# Patient Record
Sex: MALE | Race: WHITE | Employment: OTHER | URBAN - METROPOLITAN AREA
[De-identification: names, ages, dates, MRNs, and addresses within clinical notes are randomized per-mention and may not be internally consistent; named-entity substitution may affect disease eponyms.]

---

## 2018-11-16 ENCOUNTER — ANESTHESIA EVENT (OUTPATIENT)
Dept: OPERATING ROOM | Age: 58
DRG: 620 | End: 2018-11-16
Payer: COMMERCIAL

## 2018-11-19 ENCOUNTER — ANESTHESIA (OUTPATIENT)
Dept: OPERATING ROOM | Age: 58
DRG: 620 | End: 2018-11-19
Payer: COMMERCIAL

## 2018-11-19 ENCOUNTER — HOSPITAL ENCOUNTER (INPATIENT)
Age: 58
LOS: 1 days | Discharge: HOME | DRG: 620 | End: 2018-11-20
Attending: SURGERY | Admitting: SURGERY
Payer: COMMERCIAL

## 2018-11-19 VITALS — OXYGEN SATURATION: 94 % | SYSTOLIC BLOOD PRESSURE: 156 MMHG | TEMPERATURE: 98.1 F | DIASTOLIC BLOOD PRESSURE: 74 MMHG

## 2018-11-19 LAB
ABO/RH: NORMAL
ANION GAP SERPL CALCULATED.3IONS-SCNC: 12 MMOL/L (ref 3–16)
ANTIBODY SCREEN: NORMAL
BUN BLDV-MCNC: 15 MG/DL (ref 7–20)
CALCIUM SERPL-MCNC: 10.2 MG/DL (ref 8.3–10.6)
CHLORIDE BLD-SCNC: 103 MMOL/L (ref 99–110)
CO2: 26 MMOL/L (ref 21–32)
CREAT SERPL-MCNC: 0.6 MG/DL (ref 0.9–1.3)
GFR AFRICAN AMERICAN: >60
GFR NON-AFRICAN AMERICAN: >60
GLUCOSE BLD-MCNC: 245 MG/DL (ref 70–99)
GLUCOSE BLD-MCNC: 247 MG/DL (ref 70–99)
GLUCOSE BLD-MCNC: 60 MG/DL (ref 70–99)
GLUCOSE BLD-MCNC: 60 MG/DL (ref 70–99)
GLUCOSE BLD-MCNC: 77 MG/DL (ref 70–99)
GLUCOSE BLD-MCNC: 91 MG/DL (ref 70–99)
HCT VFR BLD CALC: 40.1 % (ref 40.5–52.5)
HEMOGLOBIN: 13.5 G/DL (ref 13.5–17.5)
MCH RBC QN AUTO: 29.2 PG (ref 26–34)
MCHC RBC AUTO-ENTMCNC: 33.6 G/DL (ref 31–36)
MCV RBC AUTO: 86.8 FL (ref 80–100)
PDW BLD-RTO: 14.2 % (ref 12.4–15.4)
PERFORMED ON: ABNORMAL
PERFORMED ON: NORMAL
PERFORMED ON: NORMAL
PLATELET # BLD: 272 K/UL (ref 135–450)
PMV BLD AUTO: 8.6 FL (ref 5–10.5)
POTASSIUM SERPL-SCNC: 3.7 MMOL/L (ref 3.5–5.1)
RBC # BLD: 4.62 M/UL (ref 4.2–5.9)
SODIUM BLD-SCNC: 141 MMOL/L (ref 136–145)
WBC # BLD: 10.8 K/UL (ref 4–11)

## 2018-11-19 PROCEDURE — 0D164ZA BYPASS STOMACH TO JEJUNUM, PERCUTANEOUS ENDOSCOPIC APPROACH: ICD-10-PCS | Performed by: SURGERY

## 2018-11-19 PROCEDURE — 36415 COLL VENOUS BLD VENIPUNCTURE: CPT

## 2018-11-19 PROCEDURE — 6370000000 HC RX 637 (ALT 250 FOR IP): Performed by: ANESTHESIOLOGY

## 2018-11-19 PROCEDURE — 2709999900 HC NON-CHARGEABLE SUPPLY: Performed by: SURGERY

## 2018-11-19 PROCEDURE — 2720000010 HC SURG SUPPLY STERILE: Performed by: SURGERY

## 2018-11-19 PROCEDURE — 86900 BLOOD TYPING SEROLOGIC ABO: CPT

## 2018-11-19 PROCEDURE — 3600000004 HC SURGERY LEVEL 4 BASE: Performed by: SURGERY

## 2018-11-19 PROCEDURE — 6360000002 HC RX W HCPCS: Performed by: SURGERY

## 2018-11-19 PROCEDURE — 2500000003 HC RX 250 WO HCPCS: Performed by: SURGERY

## 2018-11-19 PROCEDURE — 3600000014 HC SURGERY LEVEL 4 ADDTL 15MIN: Performed by: SURGERY

## 2018-11-19 PROCEDURE — 2580000003 HC RX 258: Performed by: SURGERY

## 2018-11-19 PROCEDURE — 3700000000 HC ANESTHESIA ATTENDED CARE: Performed by: SURGERY

## 2018-11-19 PROCEDURE — 83036 HEMOGLOBIN GLYCOSYLATED A1C: CPT

## 2018-11-19 PROCEDURE — 2500000003 HC RX 250 WO HCPCS: Performed by: NURSE ANESTHETIST, CERTIFIED REGISTERED

## 2018-11-19 PROCEDURE — C1773 RET DEV, INSERTABLE: HCPCS | Performed by: SURGERY

## 2018-11-19 PROCEDURE — 80048 BASIC METABOLIC PNL TOTAL CA: CPT

## 2018-11-19 PROCEDURE — 6360000002 HC RX W HCPCS: Performed by: NURSE ANESTHETIST, CERTIFIED REGISTERED

## 2018-11-19 PROCEDURE — 7100000000 HC PACU RECOVERY - FIRST 15 MIN: Performed by: SURGERY

## 2018-11-19 PROCEDURE — C1713 ANCHOR/SCREW BN/BN,TIS/BN: HCPCS | Performed by: SURGERY

## 2018-11-19 PROCEDURE — 6360000002 HC RX W HCPCS: Performed by: ANESTHESIOLOGY

## 2018-11-19 PROCEDURE — C1894 INTRO/SHEATH, NON-LASER: HCPCS | Performed by: SURGERY

## 2018-11-19 PROCEDURE — S0028 INJECTION, FAMOTIDINE, 20 MG: HCPCS | Performed by: SURGERY

## 2018-11-19 PROCEDURE — 7100000001 HC PACU RECOVERY - ADDTL 15 MIN: Performed by: SURGERY

## 2018-11-19 PROCEDURE — 1200000000 HC SEMI PRIVATE

## 2018-11-19 PROCEDURE — 86901 BLOOD TYPING SEROLOGIC RH(D): CPT

## 2018-11-19 PROCEDURE — 86850 RBC ANTIBODY SCREEN: CPT

## 2018-11-19 PROCEDURE — 85027 COMPLETE CBC AUTOMATED: CPT

## 2018-11-19 PROCEDURE — 3700000001 HC ADD 15 MINUTES (ANESTHESIA): Performed by: SURGERY

## 2018-11-19 DEVICE — STAPLE REINF LN SEAMGUARD ECH60: Type: IMPLANTABLE DEVICE | Site: ABDOMEN | Status: FUNCTIONAL

## 2018-11-19 RX ORDER — FENTANYL CITRATE 50 UG/ML
50 INJECTION, SOLUTION INTRAMUSCULAR; INTRAVENOUS EVERY 5 MIN PRN
Status: DISCONTINUED | OUTPATIENT
Start: 2018-11-19 | End: 2018-11-19 | Stop reason: HOSPADM

## 2018-11-19 RX ORDER — METOCLOPRAMIDE HYDROCHLORIDE 5 MG/ML
10 INJECTION INTRAMUSCULAR; INTRAVENOUS ONCE
Status: DISCONTINUED | OUTPATIENT
Start: 2018-11-19 | End: 2018-11-19 | Stop reason: SDUPTHER

## 2018-11-19 RX ORDER — PROMETHAZINE HYDROCHLORIDE 25 MG/ML
6.25 INJECTION, SOLUTION INTRAMUSCULAR; INTRAVENOUS
Status: DISCONTINUED | OUTPATIENT
Start: 2018-11-19 | End: 2018-11-19 | Stop reason: HOSPADM

## 2018-11-19 RX ORDER — HYDROMORPHONE HCL 110MG/55ML
PATIENT CONTROLLED ANALGESIA SYRINGE INTRAVENOUS PRN
Status: DISCONTINUED | OUTPATIENT
Start: 2018-11-19 | End: 2018-11-19 | Stop reason: SDUPTHER

## 2018-11-19 RX ORDER — APREPITANT 40 MG/1
40 CAPSULE ORAL ONCE
Status: COMPLETED | OUTPATIENT
Start: 2018-11-19 | End: 2018-11-19

## 2018-11-19 RX ORDER — SERTRALINE HYDROCHLORIDE 100 MG/1
100 TABLET, FILM COATED ORAL DAILY
Status: DISCONTINUED | OUTPATIENT
Start: 2018-11-20 | End: 2018-11-20 | Stop reason: HOSPADM

## 2018-11-19 RX ORDER — ACETAMINOPHEN 10 MG/ML
1000 INJECTION, SOLUTION INTRAVENOUS ONCE
Status: DISCONTINUED | OUTPATIENT
Start: 2018-11-19 | End: 2018-11-19 | Stop reason: SDUPTHER

## 2018-11-19 RX ORDER — MIDAZOLAM HYDROCHLORIDE 1 MG/ML
INJECTION INTRAMUSCULAR; INTRAVENOUS PRN
Status: DISCONTINUED | OUTPATIENT
Start: 2018-11-19 | End: 2018-11-19 | Stop reason: SDUPTHER

## 2018-11-19 RX ORDER — CEFAZOLIN SODIUM 2 G/50ML
2 SOLUTION INTRAVENOUS ONCE
Status: DISCONTINUED | OUTPATIENT
Start: 2018-11-19 | End: 2018-11-19 | Stop reason: SDUPTHER

## 2018-11-19 RX ORDER — HYDROCODONE BITARTRATE AND ACETAMINOPHEN 5; 325 MG/1; MG/1
1 TABLET ORAL EVERY 4 HOURS PRN
Status: DISCONTINUED | OUTPATIENT
Start: 2018-11-19 | End: 2018-11-20

## 2018-11-19 RX ORDER — ACETAMINOPHEN 10 MG/ML
1000 INJECTION, SOLUTION INTRAVENOUS ONCE
Status: COMPLETED | OUTPATIENT
Start: 2018-11-19 | End: 2018-11-19

## 2018-11-19 RX ORDER — HEPARIN SODIUM 5000 [USP'U]/ML
5000 INJECTION, SOLUTION INTRAVENOUS; SUBCUTANEOUS
Status: DISCONTINUED | OUTPATIENT
Start: 2018-11-19 | End: 2018-11-19 | Stop reason: SDUPTHER

## 2018-11-19 RX ORDER — PANTOPRAZOLE SODIUM 40 MG/10ML
40 INJECTION, POWDER, LYOPHILIZED, FOR SOLUTION INTRAVENOUS DAILY
Status: DISCONTINUED | OUTPATIENT
Start: 2018-11-20 | End: 2018-11-20 | Stop reason: HOSPADM

## 2018-11-19 RX ORDER — SODIUM CHLORIDE 0.9 % (FLUSH) 0.9 %
10 SYRINGE (ML) INJECTION EVERY 12 HOURS SCHEDULED
Status: DISCONTINUED | OUTPATIENT
Start: 2018-11-19 | End: 2018-11-20 | Stop reason: HOSPADM

## 2018-11-19 RX ORDER — DEXAMETHASONE SODIUM PHOSPHATE 4 MG/ML
10 INJECTION, SOLUTION INTRA-ARTICULAR; INTRALESIONAL; INTRAMUSCULAR; INTRAVENOUS; SOFT TISSUE ONCE
Status: DISCONTINUED | OUTPATIENT
Start: 2018-11-19 | End: 2018-11-19 | Stop reason: SDUPTHER

## 2018-11-19 RX ORDER — DEXTROSE MONOHYDRATE 50 MG/ML
100 INJECTION, SOLUTION INTRAVENOUS PRN
Status: DISCONTINUED | OUTPATIENT
Start: 2018-11-19 | End: 2018-11-20 | Stop reason: HOSPADM

## 2018-11-19 RX ORDER — CEFAZOLIN SODIUM 2 G/50ML
2 SOLUTION INTRAVENOUS ONCE
Status: COMPLETED | OUTPATIENT
Start: 2018-11-19 | End: 2018-11-19

## 2018-11-19 RX ORDER — SODIUM CHLORIDE, SODIUM LACTATE, POTASSIUM CHLORIDE, CALCIUM CHLORIDE 600; 310; 30; 20 MG/100ML; MG/100ML; MG/100ML; MG/100ML
INJECTION, SOLUTION INTRAVENOUS CONTINUOUS
Status: DISCONTINUED | OUTPATIENT
Start: 2018-11-19 | End: 2018-11-19 | Stop reason: SDUPTHER

## 2018-11-19 RX ORDER — METOCLOPRAMIDE HYDROCHLORIDE 5 MG/ML
10 INJECTION INTRAMUSCULAR; INTRAVENOUS ONCE
Status: COMPLETED | OUTPATIENT
Start: 2018-11-19 | End: 2018-11-19

## 2018-11-19 RX ORDER — APREPITANT 40 MG/1
40 CAPSULE ORAL ONCE
Status: DISCONTINUED | OUTPATIENT
Start: 2018-11-19 | End: 2018-11-19 | Stop reason: SDUPTHER

## 2018-11-19 RX ORDER — ONDANSETRON 2 MG/ML
4 INJECTION INTRAMUSCULAR; INTRAVENOUS ONCE
Status: DISCONTINUED | OUTPATIENT
Start: 2018-11-19 | End: 2018-11-19 | Stop reason: SDUPTHER

## 2018-11-19 RX ORDER — DEXAMETHASONE SODIUM PHOSPHATE 4 MG/ML
10 INJECTION, SOLUTION INTRA-ARTICULAR; INTRALESIONAL; INTRAMUSCULAR; INTRAVENOUS; SOFT TISSUE ONCE
Status: COMPLETED | OUTPATIENT
Start: 2018-11-19 | End: 2018-11-19

## 2018-11-19 RX ORDER — 0.9 % SODIUM CHLORIDE 0.9 %
10 VIAL (ML) INJECTION DAILY
Status: DISCONTINUED | OUTPATIENT
Start: 2018-11-20 | End: 2018-11-20 | Stop reason: HOSPADM

## 2018-11-19 RX ORDER — SODIUM CHLORIDE 0.9 % (FLUSH) 0.9 %
10 SYRINGE (ML) INJECTION PRN
Status: DISCONTINUED | OUTPATIENT
Start: 2018-11-19 | End: 2018-11-20 | Stop reason: HOSPADM

## 2018-11-19 RX ORDER — SODIUM CHLORIDE, SODIUM LACTATE, POTASSIUM CHLORIDE, CALCIUM CHLORIDE 600; 310; 30; 20 MG/100ML; MG/100ML; MG/100ML; MG/100ML
INJECTION, SOLUTION INTRAVENOUS CONTINUOUS
Status: DISCONTINUED | OUTPATIENT
Start: 2018-11-19 | End: 2018-11-20 | Stop reason: HOSPADM

## 2018-11-19 RX ORDER — ONDANSETRON 2 MG/ML
4 INJECTION INTRAMUSCULAR; INTRAVENOUS
Status: DISCONTINUED | OUTPATIENT
Start: 2018-11-19 | End: 2018-11-19 | Stop reason: HOSPADM

## 2018-11-19 RX ORDER — EPHEDRINE SULFATE 50 MG/ML
INJECTION INTRAVENOUS PRN
Status: DISCONTINUED | OUTPATIENT
Start: 2018-11-19 | End: 2018-11-19 | Stop reason: SDUPTHER

## 2018-11-19 RX ORDER — HEPARIN SODIUM 5000 [USP'U]/ML
5000 INJECTION, SOLUTION INTRAVENOUS; SUBCUTANEOUS
Status: COMPLETED | OUTPATIENT
Start: 2018-11-19 | End: 2018-11-19

## 2018-11-19 RX ORDER — FENTANYL CITRATE 50 UG/ML
25 INJECTION, SOLUTION INTRAMUSCULAR; INTRAVENOUS EVERY 5 MIN PRN
Status: DISCONTINUED | OUTPATIENT
Start: 2018-11-19 | End: 2018-11-19 | Stop reason: HOSPADM

## 2018-11-19 RX ORDER — ONDANSETRON 2 MG/ML
4 INJECTION INTRAMUSCULAR; INTRAVENOUS EVERY 6 HOURS PRN
Status: DISCONTINUED | OUTPATIENT
Start: 2018-11-19 | End: 2018-11-20 | Stop reason: HOSPADM

## 2018-11-19 RX ORDER — SODIUM CHLORIDE, SODIUM LACTATE, POTASSIUM CHLORIDE, CALCIUM CHLORIDE 600; 310; 30; 20 MG/100ML; MG/100ML; MG/100ML; MG/100ML
INJECTION, SOLUTION INTRAVENOUS CONTINUOUS
Status: DISCONTINUED | OUTPATIENT
Start: 2018-11-19 | End: 2018-11-19

## 2018-11-19 RX ORDER — DEXTROSE MONOHYDRATE 25 G/50ML
12.5 INJECTION, SOLUTION INTRAVENOUS PRN
Status: DISCONTINUED | OUTPATIENT
Start: 2018-11-19 | End: 2018-11-20 | Stop reason: HOSPADM

## 2018-11-19 RX ORDER — FENTANYL CITRATE 50 UG/ML
INJECTION, SOLUTION INTRAMUSCULAR; INTRAVENOUS PRN
Status: DISCONTINUED | OUTPATIENT
Start: 2018-11-19 | End: 2018-11-19 | Stop reason: SDUPTHER

## 2018-11-19 RX ORDER — BUPIVACAINE HYDROCHLORIDE AND EPINEPHRINE 5; 5 MG/ML; UG/ML
INJECTION, SOLUTION EPIDURAL; INTRACAUDAL; PERINEURAL PRN
Status: DISCONTINUED | OUTPATIENT
Start: 2018-11-19 | End: 2018-11-19 | Stop reason: HOSPADM

## 2018-11-19 RX ORDER — PROPOFOL 10 MG/ML
INJECTION, EMULSION INTRAVENOUS PRN
Status: DISCONTINUED | OUTPATIENT
Start: 2018-11-19 | End: 2018-11-19 | Stop reason: SDUPTHER

## 2018-11-19 RX ORDER — MAGNESIUM HYDROXIDE 1200 MG/15ML
LIQUID ORAL CONTINUOUS PRN
Status: COMPLETED | OUTPATIENT
Start: 2018-11-19 | End: 2018-11-19

## 2018-11-19 RX ORDER — KETOROLAC TROMETHAMINE 30 MG/ML
30 INJECTION, SOLUTION INTRAMUSCULAR; INTRAVENOUS EVERY 6 HOURS
Status: DISCONTINUED | OUTPATIENT
Start: 2018-11-19 | End: 2018-11-20 | Stop reason: HOSPADM

## 2018-11-19 RX ORDER — LISINOPRIL 2.5 MG/1
2.5 TABLET ORAL DAILY
Status: DISCONTINUED | OUTPATIENT
Start: 2018-11-20 | End: 2018-11-20 | Stop reason: HOSPADM

## 2018-11-19 RX ORDER — ONDANSETRON 2 MG/ML
4 INJECTION INTRAMUSCULAR; INTRAVENOUS ONCE
Status: COMPLETED | OUTPATIENT
Start: 2018-11-19 | End: 2018-11-19

## 2018-11-19 RX ORDER — ROCURONIUM BROMIDE 10 MG/ML
INJECTION, SOLUTION INTRAVENOUS PRN
Status: DISCONTINUED | OUTPATIENT
Start: 2018-11-19 | End: 2018-11-19 | Stop reason: SDUPTHER

## 2018-11-19 RX ORDER — SODIUM CHLORIDE, SODIUM LACTATE, POTASSIUM CHLORIDE, AND CALCIUM CHLORIDE .6; .31; .03; .02 G/100ML; G/100ML; G/100ML; G/100ML
IRRIGANT IRRIGATION PRN
Status: DISCONTINUED | OUTPATIENT
Start: 2018-11-19 | End: 2018-11-19 | Stop reason: HOSPADM

## 2018-11-19 RX ORDER — NICOTINE POLACRILEX 4 MG
15 LOZENGE BUCCAL PRN
Status: DISCONTINUED | OUTPATIENT
Start: 2018-11-19 | End: 2018-11-20 | Stop reason: HOSPADM

## 2018-11-19 RX ORDER — HYDROCODONE BITARTRATE AND ACETAMINOPHEN 5; 325 MG/1; MG/1
2 TABLET ORAL EVERY 4 HOURS PRN
Status: DISCONTINUED | OUTPATIENT
Start: 2018-11-19 | End: 2018-11-20

## 2018-11-19 RX ORDER — SUCCINYLCHOLINE CHLORIDE 20 MG/ML
INJECTION INTRAMUSCULAR; INTRAVENOUS PRN
Status: DISCONTINUED | OUTPATIENT
Start: 2018-11-19 | End: 2018-11-19 | Stop reason: SDUPTHER

## 2018-11-19 RX ADMIN — PROPOFOL 200 MG: 10 INJECTION, EMULSION INTRAVENOUS at 19:34

## 2018-11-19 RX ADMIN — KETOROLAC TROMETHAMINE 30 MG: 30 INJECTION, SOLUTION INTRAMUSCULAR at 22:23

## 2018-11-19 RX ADMIN — ROCURONIUM BROMIDE 50 MG: 10 INJECTION, SOLUTION INTRAVENOUS at 19:42

## 2018-11-19 RX ADMIN — HYDROMORPHONE HYDROCHLORIDE 1 MG: 2 INJECTION, SOLUTION INTRAMUSCULAR; INTRAVENOUS; SUBCUTANEOUS at 21:45

## 2018-11-19 RX ADMIN — CEFAZOLIN SODIUM 2 G: 2 SOLUTION INTRAVENOUS at 19:42

## 2018-11-19 RX ADMIN — ROCURONIUM BROMIDE 20 MG: 10 INJECTION, SOLUTION INTRAVENOUS at 20:15

## 2018-11-19 RX ADMIN — SODIUM CHLORIDE, SODIUM LACTATE, POTASSIUM CHLORIDE, AND CALCIUM CHLORIDE: 600; 310; 30; 20 INJECTION, SOLUTION INTRAVENOUS at 14:01

## 2018-11-19 RX ADMIN — FENTANYL CITRATE 100 MCG: 50 INJECTION INTRAMUSCULAR; INTRAVENOUS at 19:24

## 2018-11-19 RX ADMIN — FENTANYL CITRATE 100 MCG: 50 INJECTION INTRAMUSCULAR; INTRAVENOUS at 19:34

## 2018-11-19 RX ADMIN — APREPITANT 40 MG: 40 CAPSULE ORAL at 16:10

## 2018-11-19 RX ADMIN — ACETAMINOPHEN 1000 MG: 10 INJECTION, SOLUTION INTRAVENOUS at 19:24

## 2018-11-19 RX ADMIN — SUGAMMADEX 200 MG: 100 INJECTION, SOLUTION INTRAVENOUS at 21:30

## 2018-11-19 RX ADMIN — EPHEDRINE SULFATE 10 MG: 50 INJECTION INTRAVENOUS at 20:05

## 2018-11-19 RX ADMIN — EPHEDRINE SULFATE 10 MG: 50 INJECTION INTRAVENOUS at 20:00

## 2018-11-19 RX ADMIN — HYDROMORPHONE HYDROCHLORIDE 1 MG: 2 INJECTION, SOLUTION INTRAMUSCULAR; INTRAVENOUS; SUBCUTANEOUS at 21:00

## 2018-11-19 RX ADMIN — FENTANYL CITRATE 25 MCG: 50 INJECTION, SOLUTION INTRAMUSCULAR; INTRAVENOUS at 22:40

## 2018-11-19 RX ADMIN — SUCCINYLCHOLINE CHLORIDE 140 MG: 20 INJECTION, SOLUTION INTRAMUSCULAR; INTRAVENOUS; PARENTERAL at 19:34

## 2018-11-19 RX ADMIN — MIDAZOLAM HYDROCHLORIDE 2 MG: 1 INJECTION INTRAMUSCULAR; INTRAVENOUS at 21:49

## 2018-11-19 RX ADMIN — HEPARIN SODIUM 5000 UNITS: 5000 INJECTION INTRAVENOUS; SUBCUTANEOUS at 17:29

## 2018-11-19 RX ADMIN — METRONIDAZOLE 1000 MG: 500 INJECTION, SOLUTION INTRAVENOUS at 19:42

## 2018-11-19 RX ADMIN — DEXAMETHASONE SODIUM PHOSPHATE 10 MG: 4 INJECTION, SOLUTION INTRAMUSCULAR; INTRAVENOUS at 16:22

## 2018-11-19 RX ADMIN — INSULIN HUMAN 10 UNITS: 100 INJECTION, SOLUTION PARENTERAL at 22:19

## 2018-11-19 RX ADMIN — ONDANSETRON 4 MG: 2 INJECTION INTRAMUSCULAR; INTRAVENOUS at 17:34

## 2018-11-19 RX ADMIN — FAMOTIDINE 20 MG: 10 INJECTION, SOLUTION INTRAVENOUS at 16:13

## 2018-11-19 RX ADMIN — FENTANYL CITRATE 50 MCG: 50 INJECTION, SOLUTION INTRAMUSCULAR; INTRAVENOUS at 22:21

## 2018-11-19 RX ADMIN — FENTANYL CITRATE 25 MCG: 50 INJECTION, SOLUTION INTRAMUSCULAR; INTRAVENOUS at 22:34

## 2018-11-19 RX ADMIN — MIDAZOLAM HYDROCHLORIDE 2 MG: 1 INJECTION INTRAMUSCULAR; INTRAVENOUS at 21:53

## 2018-11-19 RX ADMIN — SODIUM CHLORIDE, SODIUM LACTATE, POTASSIUM CHLORIDE, AND CALCIUM CHLORIDE: 600; 310; 30; 20 INJECTION, SOLUTION INTRAVENOUS at 19:45

## 2018-11-19 RX ADMIN — EPHEDRINE SULFATE 10 MG: 50 INJECTION INTRAVENOUS at 20:10

## 2018-11-19 RX ADMIN — EPHEDRINE SULFATE 20 MG: 50 INJECTION INTRAVENOUS at 20:36

## 2018-11-19 RX ADMIN — SODIUM CHLORIDE, SODIUM LACTATE, POTASSIUM CHLORIDE, AND CALCIUM CHLORIDE: 600; 310; 30; 20 INJECTION, SOLUTION INTRAVENOUS at 21:57

## 2018-11-19 RX ADMIN — METOCLOPRAMIDE 10 MG: 5 INJECTION, SOLUTION INTRAMUSCULAR; INTRAVENOUS at 18:17

## 2018-11-19 ASSESSMENT — PULMONARY FUNCTION TESTS
PIF_VALUE: 24
PIF_VALUE: 21
PIF_VALUE: 30
PIF_VALUE: 9
PIF_VALUE: 30
PIF_VALUE: 31
PIF_VALUE: 25
PIF_VALUE: 30
PIF_VALUE: 24
PIF_VALUE: 30
PIF_VALUE: 24
PIF_VALUE: 30
PIF_VALUE: 0
PIF_VALUE: 30
PIF_VALUE: 20
PIF_VALUE: 30
PIF_VALUE: 1
PIF_VALUE: 30
PIF_VALUE: 30
PIF_VALUE: 25
PIF_VALUE: 30
PIF_VALUE: 30
PIF_VALUE: 17
PIF_VALUE: 30
PIF_VALUE: 31
PIF_VALUE: 30
PIF_VALUE: 24
PIF_VALUE: 30
PIF_VALUE: 33
PIF_VALUE: 24
PIF_VALUE: 30
PIF_VALUE: 31
PIF_VALUE: 30
PIF_VALUE: 2
PIF_VALUE: 30
PIF_VALUE: 3
PIF_VALUE: 30
PIF_VALUE: 30
PIF_VALUE: 25
PIF_VALUE: 30
PIF_VALUE: 30
PIF_VALUE: 18
PIF_VALUE: 1
PIF_VALUE: 30
PIF_VALUE: 25
PIF_VALUE: 30
PIF_VALUE: 6
PIF_VALUE: 30
PIF_VALUE: 1
PIF_VALUE: 30
PIF_VALUE: 1
PIF_VALUE: 1
PIF_VALUE: 25
PIF_VALUE: 20
PIF_VALUE: 31
PIF_VALUE: 30
PIF_VALUE: 30
PIF_VALUE: 1
PIF_VALUE: 30
PIF_VALUE: 1
PIF_VALUE: 5
PIF_VALUE: 30
PIF_VALUE: 25
PIF_VALUE: 30
PIF_VALUE: 17
PIF_VALUE: 30
PIF_VALUE: 1
PIF_VALUE: 30
PIF_VALUE: 24
PIF_VALUE: 1
PIF_VALUE: 24
PIF_VALUE: 30
PIF_VALUE: 24
PIF_VALUE: 30
PIF_VALUE: 18
PIF_VALUE: 24
PIF_VALUE: 23
PIF_VALUE: 25
PIF_VALUE: 31
PIF_VALUE: 1
PIF_VALUE: 30
PIF_VALUE: 30
PIF_VALUE: 1
PIF_VALUE: 30
PIF_VALUE: 24
PIF_VALUE: 24
PIF_VALUE: 30
PIF_VALUE: 30
PIF_VALUE: 32
PIF_VALUE: 30
PIF_VALUE: 1
PIF_VALUE: 30
PIF_VALUE: 21
PIF_VALUE: 30
PIF_VALUE: 3
PIF_VALUE: 30
PIF_VALUE: 10
PIF_VALUE: 30
PIF_VALUE: 30
PIF_VALUE: 18
PIF_VALUE: 30
PIF_VALUE: 14
PIF_VALUE: 30
PIF_VALUE: 1
PIF_VALUE: 30
PIF_VALUE: 24
PIF_VALUE: 30
PIF_VALUE: 17
PIF_VALUE: 30

## 2018-11-19 ASSESSMENT — PAIN SCALES - GENERAL
PAINLEVEL_OUTOF10: 6
PAINLEVEL_OUTOF10: 7
PAINLEVEL_OUTOF10: 6
PAINLEVEL_OUTOF10: 5

## 2018-11-19 ASSESSMENT — PAIN - FUNCTIONAL ASSESSMENT: PAIN_FUNCTIONAL_ASSESSMENT: 0-10

## 2018-11-19 ASSESSMENT — LIFESTYLE VARIABLES: SMOKING_STATUS: 0

## 2018-11-19 NOTE — H&P
(BMI) 40.0-44.9, adult   3)  I25.10 Atherosclerotic heart disease of native coronary artery without angina pectoris   4)  E11.9 Type 2 diabetes mellitus without complications   5)  I83 Essential (primary) hypertension   6)  E78.0 Pure hypercholesterolemia, exp as of 10-01-16   7)  I25.2 Old myocardial infarction   8)  I20.9 Angina pectoris, unspecified   9)  G47.9 Sleep disorder, unspecified  10)  F41.9 Anxiety disorder, unspecified  11)  Z98.84 Bariatric surgery status  12)  K90.9 Intestinal malabsorption, unspecified  13)  K21.9 Gastro-esophageal reflux disease without esophagitis  Mr. Bharathi Stringer comes in today for his procedure. He remains a good candidate and desires to proceed. Counseling:    I saw Aditya Avelar in pre-op hold and we discussed the risks/benefits/alternatives of gastric bypass. We diiscussed post-op leaks, bleeding, and infection risks. We discussed the need for long-term vitamin supplementation and monitoring. Lastly, we discussed potential long-term complications such as marginal ulcers and bowel obstructions. Aditya Avelar and I also discussed in detail that the risks for complications associated with the proposed procedure are greater than usual when converting/revising a bariatric operation. For instance, the risk for leak, bleeding and blood clots are greatly increased compared to a primary operation. Aditya Avelar demonstrates understanding and agrees to proceed. Aditya Avelar, any available family members, and I discussed the possibility for aborting the proposed procedure. We discussed that this decision could be made intraoperatively if anatomical considerations or extensive adhesions would lead to greater than usual risk. He demonstrates understanding and accepts this as a possible outcome.

## 2018-11-19 NOTE — ANESTHESIA PRE PROCEDURE
% 50 mL IVPB (duplex)  2 g Intravenous Once Mitch Rodes, DO        metronidazole (FLAGYL) 1,000 mg IVPB  1,000 mg Intravenous On Call to [facility], DO        heparin (porcine) injection 5,000 Units  5,000 Units Subcutaneous On Call to [address] [place] [address], DO        acetaminophen (OFIRMEV) infusion 1,000 mg  1,000 mg Intravenous Once Mitch Rodes, DO        famotidine (PEPCID) injection 20 mg  20 mg Intravenous Once Mitch Rodes, DO        [facility] 4 mg  4 mg Intravenous Once Mitch Rodes, DO        metoclopramide (REGLAN) injection 10 mg  10 mg Intravenous Once Mitch Rodes, DO        Dexamethasone Sodium Phosphate injection 10 mg  10 mg Intravenous Once Brittni S Sveta, DO        aprepitant (EMEND) capsule 40 mg  40 mg Oral Once Brittni S Mad[place], DO        lactated ringers infusion   Intravenous Continuous Jun Arellano MD           Allergies:  No Known Allergies    Problem List:    Patient Active Problem List   Diagnosis Code    Morbid obesity ([facility] [address] E66.01       Past Medical History:        Diagnosis Date    Arthritis     Asthma     CAD (coronary artery disease)     Depression     Diabetes mellitus ([facility] [address] [facility]     GERD (gastroesophageal reflux disease)     Hyperlipidemia     Obesity     Vitamin D deficiency        Past Surgical History:        Procedure Laterality Date    ANGIOPLASTY  sept 2015    stent    CHOLECYSTECTOMY      HERNIA REPAIR      three    OTHER SURGICAL HISTORY N/A 10/20/2015    LAPAROSCOPIC SLEEVE GASTRECTOMY           OTHER SURGICAL HISTORY      interstim     bladder and bowel Right Back Hip    TESTICLE REMOVAL Right        Social History:    Social History   Substance Use Topics    Smoking status: Never Smoker    Smokeless tobacco: Never Used    Alcohol use No                                Counseling given: Not Answered      Vital Signs (Current):   Vitals:    11/16/18 1410   Weight: 295 lb (133.8 kg)   Height: 6' 1\" (1.854 m) GI/Hepatic/Renal:   (+) hiatal hernia, GERD: well controlled, morbid obesity          Endo/Other: Negative Endo/Other ROS   (+) DiabetesType II DM, well controlled, using insulin, . ROS comment: Vitamin D deficiency Abdominal:           Vascular: negative vascular ROS. Anesthesia Plan      general     ASA 3       Induction: intravenous. MIPS: Postoperative opioids intended. Anesthetic plan and risks discussed with patient. Plan discussed with CRNA.     Attending anesthesiologist reviewed and agrees with Pavel Mehta MD   11/19/2018

## 2018-11-20 VITALS
RESPIRATION RATE: 18 BRPM | WEIGHT: 296 LBS | BODY MASS INDEX: 39.23 KG/M2 | HEIGHT: 73 IN | SYSTOLIC BLOOD PRESSURE: 130 MMHG | HEART RATE: 84 BPM | OXYGEN SATURATION: 95 % | DIASTOLIC BLOOD PRESSURE: 70 MMHG | TEMPERATURE: 98 F

## 2018-11-20 LAB
ANION GAP SERPL CALCULATED.3IONS-SCNC: 11 MMOL/L (ref 3–16)
BUN BLDV-MCNC: 22 MG/DL (ref 7–20)
CALCIUM SERPL-MCNC: 9.5 MG/DL (ref 8.3–10.6)
CHLORIDE BLD-SCNC: 100 MMOL/L (ref 99–110)
CO2: 25 MMOL/L (ref 21–32)
CREAT SERPL-MCNC: 0.8 MG/DL (ref 0.9–1.3)
ESTIMATED AVERAGE GLUCOSE: 139.9 MG/DL
GFR AFRICAN AMERICAN: >60
GFR NON-AFRICAN AMERICAN: >60
GLUCOSE BLD-MCNC: 191 MG/DL (ref 70–99)
GLUCOSE BLD-MCNC: 197 MG/DL (ref 70–99)
GLUCOSE BLD-MCNC: 216 MG/DL (ref 70–99)
HBA1C MFR BLD: 6.5 %
HCT VFR BLD CALC: 36.2 % (ref 40.5–52.5)
HEMOGLOBIN: 11.9 G/DL (ref 13.5–17.5)
PERFORMED ON: ABNORMAL
PERFORMED ON: ABNORMAL
POTASSIUM SERPL-SCNC: 4.6 MMOL/L (ref 3.5–5.1)
SODIUM BLD-SCNC: 136 MMOL/L (ref 136–145)

## 2018-11-20 PROCEDURE — 94799 UNLISTED PULMONARY SVC/PX: CPT

## 2018-11-20 PROCEDURE — 2580000003 HC RX 258: Performed by: SURGERY

## 2018-11-20 PROCEDURE — 94760 N-INVAS EAR/PLS OXIMETRY 1: CPT

## 2018-11-20 PROCEDURE — 85014 HEMATOCRIT: CPT

## 2018-11-20 PROCEDURE — 6370000000 HC RX 637 (ALT 250 FOR IP): Performed by: STUDENT IN AN ORGANIZED HEALTH CARE EDUCATION/TRAINING PROGRAM

## 2018-11-20 PROCEDURE — 80048 BASIC METABOLIC PNL TOTAL CA: CPT

## 2018-11-20 PROCEDURE — 94150 VITAL CAPACITY TEST: CPT

## 2018-11-20 PROCEDURE — 6370000000 HC RX 637 (ALT 250 FOR IP): Performed by: SURGERY

## 2018-11-20 PROCEDURE — C9113 INJ PANTOPRAZOLE SODIUM, VIA: HCPCS | Performed by: SURGERY

## 2018-11-20 PROCEDURE — 36415 COLL VENOUS BLD VENIPUNCTURE: CPT

## 2018-11-20 PROCEDURE — 94664 DEMO&/EVAL PT USE INHALER: CPT

## 2018-11-20 PROCEDURE — 6360000002 HC RX W HCPCS: Performed by: SURGERY

## 2018-11-20 PROCEDURE — 85018 HEMOGLOBIN: CPT

## 2018-11-20 RX ORDER — OXYCODONE HYDROCHLORIDE AND ACETAMINOPHEN 5; 325 MG/1; MG/1
1 TABLET ORAL EVERY 4 HOURS PRN
Status: DISCONTINUED | OUTPATIENT
Start: 2018-11-20 | End: 2018-11-20 | Stop reason: HOSPADM

## 2018-11-20 RX ORDER — SOFT LENS RINSE,STORE SOLUTION
1 SOLUTION, NON-ORAL MISCELLANEOUS PRN
Status: DISCONTINUED | OUTPATIENT
Start: 2018-11-20 | End: 2018-11-20 | Stop reason: HOSPADM

## 2018-11-20 RX ORDER — OXYCODONE HYDROCHLORIDE AND ACETAMINOPHEN 5; 325 MG/1; MG/1
2 TABLET ORAL EVERY 4 HOURS PRN
Status: DISCONTINUED | OUTPATIENT
Start: 2018-11-20 | End: 2018-11-20 | Stop reason: HOSPADM

## 2018-11-20 RX ADMIN — PANTOPRAZOLE SODIUM 40 MG: 40 INJECTION, POWDER, FOR SOLUTION INTRAVENOUS at 08:23

## 2018-11-20 RX ADMIN — HYOSCYAMINE SULFATE 125 MCG: 0.12 TABLET, ORALLY DISINTEGRATING ORAL at 00:00

## 2018-11-20 RX ADMIN — KETOROLAC TROMETHAMINE 30 MG: 30 INJECTION, SOLUTION INTRAMUSCULAR at 11:22

## 2018-11-20 RX ADMIN — INSULIN LISPRO 3 UNITS: 100 INJECTION, SOLUTION INTRAVENOUS; SUBCUTANEOUS at 08:25

## 2018-11-20 RX ADMIN — INSULIN LISPRO 3 UNITS: 100 INJECTION, SOLUTION INTRAVENOUS; SUBCUTANEOUS at 00:00

## 2018-11-20 RX ADMIN — HYOSCYAMINE SULFATE 125 MCG: 0.12 TABLET, ORALLY DISINTEGRATING ORAL at 05:34

## 2018-11-20 RX ADMIN — INSULIN LISPRO 3 UNITS: 100 INJECTION, SOLUTION INTRAVENOUS; SUBCUTANEOUS at 11:22

## 2018-11-20 RX ADMIN — METFORMIN HYDROCHLORIDE 500 MG: 500 TABLET, FILM COATED ORAL at 00:00

## 2018-11-20 RX ADMIN — METFORMIN HYDROCHLORIDE 500 MG: 500 TABLET, FILM COATED ORAL at 08:23

## 2018-11-20 RX ADMIN — SERTRALINE HYDROCHLORIDE 100 MG: 100 TABLET ORAL at 08:23

## 2018-11-20 RX ADMIN — Medication 1 DROP: at 05:35

## 2018-11-20 RX ADMIN — HYOSCYAMINE SULFATE 125 MCG: 0.12 TABLET, ORALLY DISINTEGRATING ORAL at 11:22

## 2018-11-20 RX ADMIN — LISINOPRIL 2.5 MG: 2.5 TABLET ORAL at 08:23

## 2018-11-20 RX ADMIN — ENOXAPARIN SODIUM 40 MG: 40 INJECTION SUBCUTANEOUS at 08:24

## 2018-11-20 RX ADMIN — OXYCODONE AND ACETAMINOPHEN 1 TABLET: 5; 325 TABLET ORAL at 08:23

## 2018-11-20 RX ADMIN — Medication 10 ML: at 08:23

## 2018-11-20 ASSESSMENT — PAIN SCALES - GENERAL
PAINLEVEL_OUTOF10: 3
PAINLEVEL_OUTOF10: 3

## 2018-11-20 NOTE — OP NOTE
using 5-mm optical trocar under direct visualization. CO2 was used for pneumoperitoneum. I then placed a left flank 5-mm trocar, left upper quadrant 15-mm trocar, right upper quadrant 12-mm trocar and right subcostal 5-mm trocars all under direct visualization. I also placed a subxiphoid David liver retractor also under direct visualization. Adhesions were taken down as needed to facilitate exposure. Adhesiolysis was very difficult because of previous mesh. Once the adhesions were lysed we were able to split the omentum. The bowel was evaluated and had good reach towards the hiatus. JEJUNOJEJUNOSTOMY   The greater omentum was then identified and divided from its inferior margin to the transverse mesocolon using the Harmonic scalpel. The proximal jejunum and ligament of Treitz were then identified. We then measured the jejunum 50 cm from the ligament of Treitz and divided the small bowel using an Endo-WILEY stapler with a white cartridge. The mesentery was then divided using an Endo-WILEY stapler with a white cartridge and staple line reinforcement. The Harmonic scalpel was then used to divide the distal staple line to improve mobility of the Andi limb. The Andi limb was then measured approximately 150 cm and then was approximated [id number] with the biliopancreatic limb. The Harmonic was then used to create enterotomies at the tip of the biliopancreatic limb and on the antimesenteric margin of the distal Andi limb. A white load WILEY stapler was then inserted through the enterotomies, was closed, fired intraluminally and was then removed without difficulty. The common enterostomy was then closed using an additional firing of an Endo-WILEY stapler with a white cartridge. The mesentery was then closed in a running fashion using 2-0 silk incorporating an antiobstruction stitch between the distal Andi limb and the biliopancreatic limb. A piece of Seamguard was incorporated into the mesenteric suture line. was then resected using a white load Endo-WILEY staple firing. The specimen was collected in a specimen retrieval bag and was removed. I then placed imbricating 2-0 Vicryl sutures at the lateral margins of the anastomosis to imbricate the crossing staple line. The 34-Filipino orogastric bougie was then passed through the anastomosis without difficulty. Intraoperative underwater air leak test was performed with the installation of 60 mL of air without evidence for air leak. The orogastric tube was then removed. The Andi limb was evaluated along its entire length and demonstrated no evidence of kinking or twisting. We then utilized aerosolized fibrin glue and applied this to the entire staple line of the GJ and candy cane. At the completion of the procedure we evaluated the surgical area for bleeding and hemostasis was excellent. The [facility] liver retractor was then removed under direct visualization. The 15mm trocar site was then closed with a PDS suture and a laparoscopic port closure device. The wound was then irrigated with copious amounts of sterile saline. An 8 Fr SCOT was positioned at the fascial layer of the 15 mm wound and was brought out through the L flank 5mm trocar site. This was secured using 2-0 Silk and was placed to Bulb suction. Will plan to keep this for 1 week then remove in the office. The trocar's were used to release the pneumoperitoneum with valsalva, and were then removed All wounds were closed with simple subcuticular 4-0 Monocryl suture. Steri-Strips and dry sterile dressings were applied. The patient tolerated the procedure well and was extubated in the operating room. All counts were correct. SPECIMEN  None. DISPOSITION  Stable to recovery. COMPKLICATIONS  None. There were no complications to this procedure and at the end all counts were correct.

## 2018-11-20 NOTE — PROGRESS NOTES
PACU Transfer Note    Vitals:    11/19/18 2243   BP: (!) 111/55   Pulse: 94   Resp: 16   Temp: 97.6 °F (36.4 °C)   SpO2: 97%   BP WDL and meets criteria for transfer to inpatient unit.      In: 6256 [I.V.:1505]  Out: 50     Pain assessment:  present - adequately treated  Pain Level: 5    Report given to Receiving unit RN.    11/19/2018 10:45 PM

## 2018-11-20 NOTE — PROGRESS NOTES
Patient discharged to home, IV removed, pressure dressing applied. AVS reviewed with patient and spouse, patient verbalized understanding. Patient escorted to private vehicle via wheelchair without incident.

## 2021-02-10 ENCOUNTER — HOSPITAL ENCOUNTER (OUTPATIENT)
Dept: CT IMAGING | Age: 61
Discharge: HOME | End: 2021-02-10
Payer: MEDICARE

## 2021-02-10 DIAGNOSIS — M17.11 OSTEOARTHRITIS OF RIGHT KNEE, UNSPECIFIED OSTEOARTHRITIS TYPE: ICD-10-CM

## 2021-02-10 PROCEDURE — 73700 CT LOWER EXTREMITY W/O DYE: CPT

## 2021-03-13 ENCOUNTER — OFFICE VISIT (OUTPATIENT)
Dept: PRIMARY CARE CLINIC | Age: 61
End: 2021-03-13
Payer: MEDICARE

## 2021-03-13 DIAGNOSIS — Z01.818 PREOP EXAMINATION: Primary | ICD-10-CM

## 2021-03-13 PROCEDURE — G8421 BMI NOT CALCULATED: HCPCS | Performed by: NURSE PRACTITIONER

## 2021-03-13 PROCEDURE — 99211 OFF/OP EST MAY X REQ PHY/QHP: CPT | Performed by: NURSE PRACTITIONER

## 2021-03-13 PROCEDURE — G8428 CUR MEDS NOT DOCUMENT: HCPCS | Performed by: NURSE PRACTITIONER

## 2021-03-14 LAB — SARS-COV-2: NOT DETECTED

## 2021-04-09 ENCOUNTER — NURSE ONLY (OUTPATIENT)
Dept: PRIMARY CARE CLINIC | Age: 61
End: 2021-04-09
Payer: MEDICARE

## 2021-04-09 DIAGNOSIS — Z01.818 PREOP EXAMINATION: Primary | ICD-10-CM

## 2021-04-09 PROCEDURE — 99211 OFF/OP EST MAY X REQ PHY/QHP: CPT | Performed by: NURSE PRACTITIONER

## 2021-04-09 RX ORDER — ALBUTEROL SULFATE 90 UG/1
2 AEROSOL, METERED RESPIRATORY (INHALATION) EVERY 4 HOURS PRN
COMMUNITY
Start: 2020-11-21

## 2021-04-09 NOTE — PROGRESS NOTES
[phone]. 4. Please call [phone] option #2 option #2 if you have not been preregistered yet. On the day of your procedure bring your insurance card and photo ID. You will be registered at your bedside once brought back to your room. 5. DO NOT EAT ANYTHING eight hours prior to your arrival for surgery. May have 8 ounces of water 4 hours prior to your arrival for surgery. NOTE: ALL Gastric, Bariatric and Bowel surgery patients MUST follow their surgeon's instructions. 6. MEDICATIONS    Take the following medications with a SMALL sip of water: protonix   Use your usual dose of inhalers the morning of surgery. BRING your rescue inhaler with you to hospital.    Anesthesia does NOT want you to take insulin the morning of surgery. They will control your blood sugar while you are at the hospital. Please contact your ordering physician for instructions regarding your insulin the night before your procedure. If you have an insulin pump, please keep it set on basal rate. 7. Do not swallow water when brushing teeth. No gum, candy, mints or ice chips. Refrain from smoking or at least decrease the amount. 8. Dress in loose, comfortable clothing appropriate for redressing after your procedure. Do not wear jewelry (including body piercings), make-up (especially NO eye make-up), fingernail polish (NO toenail polish if foot/leg surgery), lotion, powders or metal hairclips. 9. Dentures, glasses, or contacts will need to be removed before your procedure. Bring cases for your glasses, contacts, dentures, or hearing aids to protect them while you are in surgery. 10. If you use a CPAP, please bring it with you on the day of your procedure. 11. We recommend that valuable personal  belongings such as cash, cell phones, e-tablets or jewelry, be left at home during your stay. The hospital will not be responsible for valuables that are not secured in the hospital safe.  However, if your insurance requires a co-pay, you may want to bring a method of payment, i.e. Check or credit card, if you wish to pay your co-pay the day of surgery. 12. If you are to stay overnight, you may bring a bag with personal items. Please have any large items you may need brought in by your family after your arrival to your hospital room. 15. If you have a Living Will or Durable Power of , please bring a copy on the day of your procedure. 15. With your permission, one family member may accompany you while you are being prepared for surgery. Once you are ready, additional family members may join you. HOW WE KEEP YOU SAFE and WORK TO PREVENT SURGICAL SITE INFECTIONS:  1. Health care workers should always check your ID bracelet to verify your name and birth date. You will be asked many times to state your name, date of birth, and allergies. 2. Health care workers should always clean their hands with soap or alcohol gel before providing care to you. It is okay to ask anyone if they cleaned their hands before they touch you. 3. You will be actively involved in verifying the type of procedure you are having and ensuring the correct surgical site. This will be confirmed multiple times prior to your procedure. Do NOT lionel your surgery site UNLESS instructed to by your surgeon. 4. Do not shave or wax for 72 hours prior to procedure near your operative site. Shaving with a razor can irritate your skin and make it easier to develop an infection. On the day of your procedure, any hair that needs to be removed near the surgical site will be clipped by a healthcare worker using a special clippers designed to avoid skin irritation. 5. When you are in the operating room, your surgical site will be cleansed with a special soap, and in most cases, you will be given an antibiotic before the surgery begins. What to expect AFTER YOUR PROCEDURE:  1.  Immediately following your procedure, your will be taken to the PACU for the first phase of your recovery. Your nurse will help you recover from any potential side effects of anesthesia, such as extreme drowsiness, changes in your vital signs or breathing patterns. Nausea, headache, muscle aches, or sore throat may also occur after anesthesia. Your nurse will help you manage these potential side effects. 2. For comfort and safety, arrange to have someone at home with you for the first 24 hours after discharge. 3. You and your family will be given written instructions about your diet, activity, dressing care, medications, and return visits. 4. Once at home, should issues with nausea, pain, or bleeding occur, or should you notice any signs of infection, you should call your surgeon. 5. Always clean your hands before and after caring for your wound. Do not let your family touch your surgery site without cleaning their hands. 6. Narcotic pain medications can cause significant constipation. You may want to add a stool softener to your postoperative medication schedule or speak to your surgeon on how best to manage this SIDE EFFECT. SPECIAL INSTRUCTIONS      Thank you for allowing us to care for you. We strive to exceed your expectations in the delivery of care and service provided to you and your family. If you need to contact the [facility] [place] [address] staff for any reason, please call us at [phone]    Instructions reviewed with patient during preadmission testing phone interview. Teena Key. 4/9/2021 .11:01 AM      ADDITIONAL EDUCATIONAL INFORMATION REVIEWED PER PHONE WITH YOU AND/OR YOUR FAMILY:  Yes Hibiclens® Bathing Instructions   No Antibacterial Soap

## 2021-04-10 LAB — SARS-COV-2: NOT DETECTED

## 2021-04-11 LAB — CULTURE NOSE: NORMAL

## 2021-04-14 ENCOUNTER — ANESTHESIA EVENT (OUTPATIENT)
Dept: OPERATING ROOM | Age: 61
DRG: 470 | End: 2021-04-14
Payer: MEDICARE

## 2021-04-14 NOTE — PROGRESS NOTES
[facility] / [facility] [place] ([facility]) Kathy Garcia, [address]    Acknowledgment of Informed Consent for Surgical or Medical Procedure and Sedation  I agree to allow doctor(s) FAISAL DEJESUS  and his/her associates or assistants, including residents and/or other qualified medical practitioner to perform the following medical treatment or procedure and to administer or direct the administration of sedation as necessary:  Procedure(s): RIGHT TOTAL KNEE ARTHROPLASTY [address] [place]       My doctor has explained the following regarding the proposed procedure:   the explanation of the procedure   the benefits of the procedure   the potential problems that might occur during recuperation   the risks and side effects of the procedure which could include but are not limited to severe blood loss, infection, stroke or death   the benefits, risks and side effect of alternative procedures including the consequences of declining this procedure or any alternative procedures   the likelihood of achieving satisfactory results. I acknowledge no guarantee or assurance has been made to me regarding the results. I understand that during the course of this treatment/procedure, unforeseen conditions can occur which require an additional or different procedure. I agree to allow my physician or assistants to perform such extension of the original procedure as they may find necessary. I understand that sedation will often result in temporary impairment of memory and fine motor skills and that sedation can occasionally progress to a state of deep sedation or general anesthesia. I understand the risks of anesthesia for surgery include, but are not limited to, sore throat, hoarseness, injury to face, mouth, or teeth; nausea; headache; injury to blood vessels or nerves; death, brain damage, or paralysis.     I understand that if I have a Limitation of Treatment order in effect during my hospitalization, the order may or may not be in effect during this procedure. I give my doctor permission to give me blood or blood products. I understand that there are risks with receiving blood such as hepatitis, AIDS, fever, or allergic reaction. I acknowledge that the risks, benefits, and alternatives of this treatment have been explained to me and that no express or implied warranty has been given by the hospital, any blood bank, or any person or entity as to the blood or blood components transfused. At the discretion of my doctor, I agree to allow observers, equipment/product representatives and allow photographing, and/or televising of the procedure, provided my name or identity is maintained confidentially. I agree the hospital may dispose of or use for scientific or educational purposes any tissue, fluid, or body parts which may be removed.     ________________________________Date________Time______ am/pm  ([place] [facility])  Patient or Signature of Closest Relative or Legal Guardian    ________________________________Date________Time______am/pm      Page 1 of  1  Witness

## 2021-04-15 ENCOUNTER — HOSPITAL ENCOUNTER (INPATIENT)
Age: 61
LOS: 1 days | Discharge: NURSING, REHAB OR LONG-TERM CARE | DRG: 470 | End: 2021-04-20
Attending: ORTHOPAEDIC SURGERY | Admitting: ORTHOPAEDIC SURGERY
Payer: MEDICARE

## 2021-04-15 ENCOUNTER — APPOINTMENT (OUTPATIENT)
Dept: GENERAL RADIOLOGY | Age: 61
DRG: 470 | End: 2021-04-15
Attending: ORTHOPAEDIC SURGERY
Payer: MEDICARE

## 2021-04-15 ENCOUNTER — ANESTHESIA (OUTPATIENT)
Dept: OPERATING ROOM | Age: 61
DRG: 470 | End: 2021-04-15
Payer: MEDICARE

## 2021-04-15 VITALS — SYSTOLIC BLOOD PRESSURE: 144 MMHG | TEMPERATURE: 97.9 F | OXYGEN SATURATION: 85 % | DIASTOLIC BLOOD PRESSURE: 86 MMHG

## 2021-04-15 DIAGNOSIS — M17.11 PRIMARY OSTEOARTHRITIS OF RIGHT KNEE: Primary | ICD-10-CM

## 2021-04-15 LAB
ABO/RH: NORMAL
ALBUMIN SERPL-MCNC: 3.7 G/DL (ref 3.4–5)
ALP BLD-CCNC: 77 U/L (ref 40–129)
ALT SERPL-CCNC: 27 U/L (ref 10–40)
ANTIBODY SCREEN: NORMAL
APTT: 33.5 SEC (ref 24.2–36.2)
AST SERPL-CCNC: 41 U/L (ref 15–37)
BILIRUB SERPL-MCNC: 0.4 MG/DL (ref 0–1)
BILIRUBIN DIRECT: <0.2 MG/DL (ref 0–0.3)
BILIRUBIN, INDIRECT: ABNORMAL MG/DL (ref 0–1)
GLUCOSE BLD-MCNC: 125 MG/DL (ref 70–99)
GLUCOSE BLD-MCNC: 201 MG/DL (ref 70–99)
GLUCOSE BLD-MCNC: 251 MG/DL (ref 70–99)
GLUCOSE BLD-MCNC: 309 MG/DL (ref 70–99)
INR BLD: 1.07 (ref 0.86–1.14)
PERFORMED ON: ABNORMAL
PROTHROMBIN TIME: 12.4 SEC (ref 10–13.2)
TOTAL PROTEIN: 7 G/DL (ref 6.4–8.2)

## 2021-04-15 PROCEDURE — 6370000000 HC RX 637 (ALT 250 FOR IP): Performed by: ORTHOPAEDIC SURGERY

## 2021-04-15 PROCEDURE — 7100000000 HC PACU RECOVERY - FIRST 15 MIN: Performed by: ORTHOPAEDIC SURGERY

## 2021-04-15 PROCEDURE — 2580000003 HC RX 258: Performed by: ORTHOPAEDIC SURGERY

## 2021-04-15 PROCEDURE — 3700000000 HC ANESTHESIA ATTENDED CARE: Performed by: ORTHOPAEDIC SURGERY

## 2021-04-15 PROCEDURE — 94799 UNLISTED PULMONARY SVC/PX: CPT

## 2021-04-15 PROCEDURE — 3600000004 HC SURGERY LEVEL 4 BASE: Performed by: ORTHOPAEDIC SURGERY

## 2021-04-15 PROCEDURE — 80076 HEPATIC FUNCTION PANEL: CPT

## 2021-04-15 PROCEDURE — 73560 X-RAY EXAM OF KNEE 1 OR 2: CPT

## 2021-04-15 PROCEDURE — 2580000003 HC RX 258: Performed by: ANESTHESIOLOGY

## 2021-04-15 PROCEDURE — C1776 JOINT DEVICE (IMPLANTABLE): HCPCS | Performed by: ORTHOPAEDIC SURGERY

## 2021-04-15 PROCEDURE — 2500000003 HC RX 250 WO HCPCS: Performed by: NURSE ANESTHETIST, CERTIFIED REGISTERED

## 2021-04-15 PROCEDURE — 6360000002 HC RX W HCPCS: Performed by: NURSE ANESTHETIST, CERTIFIED REGISTERED

## 2021-04-15 PROCEDURE — 86850 RBC ANTIBODY SCREEN: CPT

## 2021-04-15 PROCEDURE — 86901 BLOOD TYPING SEROLOGIC RH(D): CPT

## 2021-04-15 PROCEDURE — 6360000002 HC RX W HCPCS: Performed by: ORTHOPAEDIC SURGERY

## 2021-04-15 PROCEDURE — 85610 PROTHROMBIN TIME: CPT

## 2021-04-15 PROCEDURE — 2709999900 HC NON-CHARGEABLE SUPPLY: Performed by: ORTHOPAEDIC SURGERY

## 2021-04-15 PROCEDURE — 86900 BLOOD TYPING SEROLOGIC ABO: CPT

## 2021-04-15 PROCEDURE — 64447 NJX AA&/STRD FEMORAL NRV IMG: CPT | Performed by: ANESTHESIOLOGY

## 2021-04-15 PROCEDURE — 94150 VITAL CAPACITY TEST: CPT

## 2021-04-15 PROCEDURE — L5450 POSTOP APP NON-WGT BEAR DSG: HCPCS | Performed by: ORTHOPAEDIC SURGERY

## 2021-04-15 PROCEDURE — G0378 HOSPITAL OBSERVATION PER HR: HCPCS

## 2021-04-15 PROCEDURE — 8E0Y0CZ ROBOTIC ASSISTED PROCEDURE OF LOWER EXTREMITY, OPEN APPROACH: ICD-10-PCS | Performed by: ORTHOPAEDIC SURGERY

## 2021-04-15 PROCEDURE — 7100000001 HC PACU RECOVERY - ADDTL 15 MIN: Performed by: ORTHOPAEDIC SURGERY

## 2021-04-15 PROCEDURE — 3600000014 HC SURGERY LEVEL 4 ADDTL 15MIN: Performed by: ORTHOPAEDIC SURGERY

## 2021-04-15 PROCEDURE — 2500000003 HC RX 250 WO HCPCS: Performed by: ORTHOPAEDIC SURGERY

## 2021-04-15 PROCEDURE — 0SRC0J9 REPLACEMENT OF RIGHT KNEE JOINT WITH SYNTHETIC SUBSTITUTE, CEMENTED, OPEN APPROACH: ICD-10-PCS | Performed by: ORTHOPAEDIC SURGERY

## 2021-04-15 PROCEDURE — 6360000002 HC RX W HCPCS: Performed by: ANESTHESIOLOGY

## 2021-04-15 PROCEDURE — 85730 THROMBOPLASTIN TIME PARTIAL: CPT

## 2021-04-15 PROCEDURE — 2720000010 HC SURG SUPPLY STERILE: Performed by: ORTHOPAEDIC SURGERY

## 2021-04-15 PROCEDURE — C1713 ANCHOR/SCREW BN/BN,TIS/BN: HCPCS | Performed by: ORTHOPAEDIC SURGERY

## 2021-04-15 PROCEDURE — 94664 DEMO&/EVAL PT USE INHALER: CPT

## 2021-04-15 PROCEDURE — 2580000003 HC RX 258: Performed by: NURSE ANESTHETIST, CERTIFIED REGISTERED

## 2021-04-15 PROCEDURE — 3700000001 HC ADD 15 MINUTES (ANESTHESIA): Performed by: ORTHOPAEDIC SURGERY

## 2021-04-15 PROCEDURE — 3E0T3BZ INTRODUCTION OF ANESTHETIC AGENT INTO PERIPHERAL NERVES AND PLEXI, PERCUTANEOUS APPROACH: ICD-10-PCS | Performed by: ANESTHESIOLOGY

## 2021-04-15 DEVICE — IMPLANT PAT DIA39MM THK11MM X3 SYMMETRIC TRIATHLON: Type: IMPLANTABLE DEVICE | Site: KNEE | Status: FUNCTIONAL

## 2021-04-15 DEVICE — IMPLANTABLE DEVICE: Type: IMPLANTABLE DEVICE | Site: KNEE | Status: FUNCTIONAL

## 2021-04-15 DEVICE — BASEPLATE TIB SZ 7 UNIV KNEE TRITANIUM TOT STBL CEM: Type: IMPLANTABLE DEVICE | Site: KNEE | Status: FUNCTIONAL

## 2021-04-15 DEVICE — COMPONENT PART KNEE CAPPED K1 STRYKER: Type: IMPLANTABLE DEVICE | Site: KNEE | Status: FUNCTIONAL

## 2021-04-15 DEVICE — INSERT TIB BEAR SZ 7 THK9MM KNEE X3 CNDYL STABILIZING: Type: IMPLANTABLE DEVICE | Site: KNEE | Status: FUNCTIONAL

## 2021-04-15 DEVICE — CEMENT BNE 20ML 41GM FULL DOSE PMMA W/ TOBRA M VISC RADPQ: Type: IMPLANTABLE DEVICE | Site: KNEE | Status: FUNCTIONAL

## 2021-04-15 RX ORDER — DIPHENHYDRAMINE HYDROCHLORIDE 50 MG/ML
12.5 INJECTION INTRAMUSCULAR; INTRAVENOUS
Status: DISCONTINUED | OUTPATIENT
Start: 2021-04-15 | End: 2021-04-15

## 2021-04-15 RX ORDER — FERROUS SULFATE 325(65) MG
325 TABLET ORAL DAILY
Status: DISCONTINUED | OUTPATIENT
Start: 2021-04-16 | End: 2021-04-20 | Stop reason: HOSPADM

## 2021-04-15 RX ORDER — ROSUVASTATIN CALCIUM 20 MG/1
40 TABLET, COATED ORAL EVERY EVENING
Status: DISCONTINUED | OUTPATIENT
Start: 2021-04-15 | End: 2021-04-20 | Stop reason: HOSPADM

## 2021-04-15 RX ORDER — VANCOMYCIN HYDROCHLORIDE 500 MG/10ML
INJECTION, POWDER, LYOPHILIZED, FOR SOLUTION INTRAVENOUS PRN
Status: DISCONTINUED | OUTPATIENT
Start: 2021-04-15 | End: 2021-04-15 | Stop reason: ALTCHOICE

## 2021-04-15 RX ORDER — LISINOPRIL 5 MG/1
2.5 TABLET ORAL DAILY
Status: DISCONTINUED | OUTPATIENT
Start: 2021-04-16 | End: 2021-04-20 | Stop reason: HOSPADM

## 2021-04-15 RX ORDER — SODIUM CHLORIDE, SODIUM LACTATE, POTASSIUM CHLORIDE, CALCIUM CHLORIDE 600; 310; 30; 20 MG/100ML; MG/100ML; MG/100ML; MG/100ML
INJECTION, SOLUTION INTRAVENOUS CONTINUOUS PRN
Status: DISCONTINUED | OUTPATIENT
Start: 2021-04-15 | End: 2021-04-15 | Stop reason: SDUPTHER

## 2021-04-15 RX ORDER — ROCURONIUM BROMIDE 10 MG/ML
INJECTION, SOLUTION INTRAVENOUS PRN
Status: DISCONTINUED | OUTPATIENT
Start: 2021-04-15 | End: 2021-04-15 | Stop reason: SDUPTHER

## 2021-04-15 RX ORDER — HYDRALAZINE HYDROCHLORIDE 20 MG/ML
5 INJECTION INTRAMUSCULAR; INTRAVENOUS EVERY 10 MIN PRN
Status: DISCONTINUED | OUTPATIENT
Start: 2021-04-15 | End: 2021-04-15

## 2021-04-15 RX ORDER — OXYCODONE HYDROCHLORIDE AND ACETAMINOPHEN 5; 325 MG/1; MG/1
2 TABLET ORAL PRN
Status: DISCONTINUED | OUTPATIENT
Start: 2021-04-15 | End: 2021-04-15

## 2021-04-15 RX ORDER — DEXAMETHASONE SODIUM PHOSPHATE 10 MG/ML
INJECTION, SOLUTION INTRAMUSCULAR; INTRAVENOUS PRN
Status: DISCONTINUED | OUTPATIENT
Start: 2021-04-15 | End: 2021-04-15 | Stop reason: SDUPTHER

## 2021-04-15 RX ORDER — INSULIN LISPRO 100 [IU]/ML
0-12 INJECTION, SOLUTION INTRAVENOUS; SUBCUTANEOUS
Status: DISCONTINUED | OUTPATIENT
Start: 2021-04-15 | End: 2021-04-20 | Stop reason: HOSPADM

## 2021-04-15 RX ORDER — SENNA AND DOCUSATE SODIUM 50; 8.6 MG/1; MG/1
1 TABLET, FILM COATED ORAL 2 TIMES DAILY
Status: DISCONTINUED | OUTPATIENT
Start: 2021-04-15 | End: 2021-04-20 | Stop reason: HOSPADM

## 2021-04-15 RX ORDER — NICOTINE POLACRILEX 4 MG
15 LOZENGE BUCCAL PRN
Status: DISCONTINUED | OUTPATIENT
Start: 2021-04-15 | End: 2021-04-20 | Stop reason: HOSPADM

## 2021-04-15 RX ORDER — SODIUM CHLORIDE 9 MG/ML
INJECTION, SOLUTION INTRAVENOUS CONTINUOUS
Status: DISCONTINUED | OUTPATIENT
Start: 2021-04-15 | End: 2021-04-20 | Stop reason: HOSPADM

## 2021-04-15 RX ORDER — LANOLIN ALCOHOL/MO/W.PET/CERES
400 CREAM (GRAM) TOPICAL DAILY
Status: DISCONTINUED | OUTPATIENT
Start: 2021-04-16 | End: 2021-04-20 | Stop reason: HOSPADM

## 2021-04-15 RX ORDER — SODIUM CHLORIDE 0.9 % (FLUSH) 0.9 %
10 SYRINGE (ML) INJECTION EVERY 12 HOURS SCHEDULED
Status: DISCONTINUED | OUTPATIENT
Start: 2021-04-15 | End: 2021-04-15

## 2021-04-15 RX ORDER — SODIUM CHLORIDE, SODIUM LACTATE, POTASSIUM CHLORIDE, CALCIUM CHLORIDE 600; 310; 30; 20 MG/100ML; MG/100ML; MG/100ML; MG/100ML
INJECTION, SOLUTION INTRAVENOUS CONTINUOUS
Status: DISCONTINUED | OUTPATIENT
Start: 2021-04-15 | End: 2021-04-15

## 2021-04-15 RX ORDER — GABAPENTIN 300 MG/1
300 CAPSULE ORAL 2 TIMES DAILY
Status: DISCONTINUED | OUTPATIENT
Start: 2021-04-15 | End: 2021-04-17

## 2021-04-15 RX ORDER — IPRATROPIUM BROMIDE AND ALBUTEROL SULFATE 2.5; .5 MG/3ML; MG/3ML
3 SOLUTION RESPIRATORY (INHALATION) EVERY 4 HOURS PRN
Status: DISCONTINUED | OUTPATIENT
Start: 2021-04-15 | End: 2021-04-15 | Stop reason: SDUPTHER

## 2021-04-15 RX ORDER — SODIUM CHLORIDE 0.9 % (FLUSH) 0.9 %
10 SYRINGE (ML) INJECTION PRN
Status: DISCONTINUED | OUTPATIENT
Start: 2021-04-15 | End: 2021-04-15

## 2021-04-15 RX ORDER — OXYCODONE HYDROCHLORIDE 5 MG/1
5 TABLET ORAL
Status: DISCONTINUED | OUTPATIENT
Start: 2021-04-15 | End: 2021-04-20 | Stop reason: HOSPADM

## 2021-04-15 RX ORDER — LIDOCAINE HYDROCHLORIDE 20 MG/ML
INJECTION, SOLUTION EPIDURAL; INFILTRATION; INTRACAUDAL; PERINEURAL PRN
Status: DISCONTINUED | OUTPATIENT
Start: 2021-04-15 | End: 2021-04-15 | Stop reason: SDUPTHER

## 2021-04-15 RX ORDER — DEXTROSE MONOHYDRATE 50 MG/ML
100 INJECTION, SOLUTION INTRAVENOUS PRN
Status: DISCONTINUED | OUTPATIENT
Start: 2021-04-15 | End: 2021-04-20 | Stop reason: HOSPADM

## 2021-04-15 RX ORDER — SODIUM CHLORIDE 0.9 % (FLUSH) 0.9 %
5-40 SYRINGE (ML) INJECTION PRN
Status: DISCONTINUED | OUTPATIENT
Start: 2021-04-15 | End: 2021-04-20 | Stop reason: HOSPADM

## 2021-04-15 RX ORDER — GLYCOPYRROLATE 0.2 MG/ML
INJECTION INTRAMUSCULAR; INTRAVENOUS PRN
Status: DISCONTINUED | OUTPATIENT
Start: 2021-04-15 | End: 2021-04-15 | Stop reason: SDUPTHER

## 2021-04-15 RX ORDER — ONDANSETRON 2 MG/ML
INJECTION INTRAMUSCULAR; INTRAVENOUS PRN
Status: DISCONTINUED | OUTPATIENT
Start: 2021-04-15 | End: 2021-04-15 | Stop reason: SDUPTHER

## 2021-04-15 RX ORDER — EPHEDRINE SULFATE 50 MG/ML
INJECTION INTRAVENOUS PRN
Status: DISCONTINUED | OUTPATIENT
Start: 2021-04-15 | End: 2021-04-15 | Stop reason: SDUPTHER

## 2021-04-15 RX ORDER — ONDANSETRON 2 MG/ML
4 INJECTION INTRAMUSCULAR; INTRAVENOUS
Status: DISCONTINUED | OUTPATIENT
Start: 2021-04-15 | End: 2021-04-15

## 2021-04-15 RX ORDER — HYDROMORPHONE HCL 110MG/55ML
PATIENT CONTROLLED ANALGESIA SYRINGE INTRAVENOUS PRN
Status: DISCONTINUED | OUTPATIENT
Start: 2021-04-15 | End: 2021-04-15 | Stop reason: SDUPTHER

## 2021-04-15 RX ORDER — OXYCODONE HYDROCHLORIDE 5 MG/1
10 TABLET ORAL
Status: DISCONTINUED | OUTPATIENT
Start: 2021-04-15 | End: 2021-04-20 | Stop reason: HOSPADM

## 2021-04-15 RX ORDER — GABAPENTIN 300 MG/1
300 CAPSULE ORAL 2 TIMES DAILY
COMMUNITY

## 2021-04-15 RX ORDER — FENTANYL CITRATE 50 UG/ML
INJECTION, SOLUTION INTRAMUSCULAR; INTRAVENOUS PRN
Status: DISCONTINUED | OUTPATIENT
Start: 2021-04-15 | End: 2021-04-15 | Stop reason: SDUPTHER

## 2021-04-15 RX ORDER — FENTANYL CITRATE 50 UG/ML
50 INJECTION, SOLUTION INTRAMUSCULAR; INTRAVENOUS EVERY 5 MIN PRN
Status: DISCONTINUED | OUTPATIENT
Start: 2021-04-15 | End: 2021-04-15

## 2021-04-15 RX ORDER — SODIUM CHLORIDE 0.9 % (FLUSH) 0.9 %
5-40 SYRINGE (ML) INJECTION EVERY 12 HOURS SCHEDULED
Status: DISCONTINUED | OUTPATIENT
Start: 2021-04-15 | End: 2021-04-20 | Stop reason: HOSPADM

## 2021-04-15 RX ORDER — ASPIRIN 81 MG/1
81 TABLET ORAL 2 TIMES DAILY
Status: DISCONTINUED | OUTPATIENT
Start: 2021-04-15 | End: 2021-04-20 | Stop reason: HOSPADM

## 2021-04-15 RX ORDER — GLYCOPYRROLATE 1 MG/5 ML
SYRINGE (ML) INTRAVENOUS PRN
Status: DISCONTINUED | OUTPATIENT
Start: 2021-04-15 | End: 2021-04-15 | Stop reason: SDUPTHER

## 2021-04-15 RX ORDER — INSULIN LISPRO 100 [IU]/ML
0-6 INJECTION, SOLUTION INTRAVENOUS; SUBCUTANEOUS NIGHTLY
Status: DISCONTINUED | OUTPATIENT
Start: 2021-04-15 | End: 2021-04-20 | Stop reason: HOSPADM

## 2021-04-15 RX ORDER — MEPERIDINE HYDROCHLORIDE 25 MG/ML
12.5 INJECTION INTRAMUSCULAR; INTRAVENOUS; SUBCUTANEOUS EVERY 5 MIN PRN
Status: DISCONTINUED | OUTPATIENT
Start: 2021-04-15 | End: 2021-04-15

## 2021-04-15 RX ORDER — MIDAZOLAM HYDROCHLORIDE 1 MG/ML
INJECTION INTRAMUSCULAR; INTRAVENOUS PRN
Status: DISCONTINUED | OUTPATIENT
Start: 2021-04-15 | End: 2021-04-15 | Stop reason: SDUPTHER

## 2021-04-15 RX ORDER — ACETAMINOPHEN 325 MG/1
650 TABLET ORAL EVERY 6 HOURS
Status: DISCONTINUED | OUTPATIENT
Start: 2021-04-15 | End: 2021-04-20 | Stop reason: HOSPADM

## 2021-04-15 RX ORDER — ONDANSETRON 2 MG/ML
4 INJECTION INTRAMUSCULAR; INTRAVENOUS EVERY 6 HOURS PRN
Status: DISCONTINUED | OUTPATIENT
Start: 2021-04-15 | End: 2021-04-20 | Stop reason: HOSPADM

## 2021-04-15 RX ORDER — LABETALOL HYDROCHLORIDE 5 MG/ML
5 INJECTION, SOLUTION INTRAVENOUS EVERY 10 MIN PRN
Status: DISCONTINUED | OUTPATIENT
Start: 2021-04-15 | End: 2021-04-15

## 2021-04-15 RX ORDER — SODIUM CHLORIDE 9 MG/ML
25 INJECTION, SOLUTION INTRAVENOUS PRN
Status: DISCONTINUED | OUTPATIENT
Start: 2021-04-15 | End: 2021-04-20 | Stop reason: HOSPADM

## 2021-04-15 RX ORDER — PROCHLORPERAZINE EDISYLATE 5 MG/ML
5 INJECTION INTRAMUSCULAR; INTRAVENOUS
Status: DISCONTINUED | OUTPATIENT
Start: 2021-04-15 | End: 2021-04-15

## 2021-04-15 RX ORDER — ALBUTEROL SULFATE 2.5 MG/3ML
2.5 SOLUTION RESPIRATORY (INHALATION) EVERY 4 HOURS PRN
Status: DISCONTINUED | OUTPATIENT
Start: 2021-04-15 | End: 2021-04-20 | Stop reason: HOSPADM

## 2021-04-15 RX ORDER — PROPOFOL 10 MG/ML
INJECTION, EMULSION INTRAVENOUS PRN
Status: DISCONTINUED | OUTPATIENT
Start: 2021-04-15 | End: 2021-04-15 | Stop reason: SDUPTHER

## 2021-04-15 RX ORDER — PROMETHAZINE HYDROCHLORIDE 25 MG/1
12.5 TABLET ORAL EVERY 6 HOURS PRN
Status: DISCONTINUED | OUTPATIENT
Start: 2021-04-15 | End: 2021-04-20 | Stop reason: HOSPADM

## 2021-04-15 RX ORDER — LANOLIN ALCOHOL/MO/W.PET/CERES
1000 CREAM (GRAM) TOPICAL DAILY
Status: DISCONTINUED | OUTPATIENT
Start: 2021-04-16 | End: 2021-04-20 | Stop reason: HOSPADM

## 2021-04-15 RX ORDER — SERTRALINE HYDROCHLORIDE 100 MG/1
100 TABLET, FILM COATED ORAL DAILY
Status: DISCONTINUED | OUTPATIENT
Start: 2021-04-16 | End: 2021-04-20 | Stop reason: HOSPADM

## 2021-04-15 RX ORDER — FENTANYL CITRATE 50 UG/ML
25 INJECTION, SOLUTION INTRAMUSCULAR; INTRAVENOUS EVERY 5 MIN PRN
Status: DISCONTINUED | OUTPATIENT
Start: 2021-04-15 | End: 2021-04-15

## 2021-04-15 RX ORDER — OXYCODONE HYDROCHLORIDE AND ACETAMINOPHEN 5; 325 MG/1; MG/1
1 TABLET ORAL PRN
Status: DISCONTINUED | OUTPATIENT
Start: 2021-04-15 | End: 2021-04-15

## 2021-04-15 RX ORDER — SUCCINYLCHOLINE CHLORIDE 20 MG/ML
INJECTION INTRAMUSCULAR; INTRAVENOUS PRN
Status: DISCONTINUED | OUTPATIENT
Start: 2021-04-15 | End: 2021-04-15 | Stop reason: SDUPTHER

## 2021-04-15 RX ORDER — POTASSIUM CHLORIDE 20 MEQ/1
20 TABLET, EXTENDED RELEASE ORAL DAILY
Status: DISCONTINUED | OUTPATIENT
Start: 2021-04-16 | End: 2021-04-20 | Stop reason: HOSPADM

## 2021-04-15 RX ORDER — SODIUM CHLORIDE 9 MG/ML
INJECTION, SOLUTION INTRAVENOUS CONTINUOUS
Status: DISCONTINUED | OUTPATIENT
Start: 2021-04-15 | End: 2021-04-15

## 2021-04-15 RX ORDER — ROPIVACAINE HYDROCHLORIDE 5 MG/ML
30 INJECTION, SOLUTION EPIDURAL; INFILTRATION; PERINEURAL ONCE
Status: DISCONTINUED | OUTPATIENT
Start: 2021-04-15 | End: 2021-04-15

## 2021-04-15 RX ORDER — DEXTROSE MONOHYDRATE 25 G/50ML
12.5 INJECTION, SOLUTION INTRAVENOUS PRN
Status: DISCONTINUED | OUTPATIENT
Start: 2021-04-15 | End: 2021-04-20 | Stop reason: HOSPADM

## 2021-04-15 RX ORDER — PANTOPRAZOLE SODIUM 40 MG/1
40 TABLET, DELAYED RELEASE ORAL
Status: DISCONTINUED | OUTPATIENT
Start: 2021-04-16 | End: 2021-04-20 | Stop reason: HOSPADM

## 2021-04-15 RX ORDER — SODIUM CHLORIDE 9 MG/ML
25 INJECTION, SOLUTION INTRAVENOUS PRN
Status: DISCONTINUED | OUTPATIENT
Start: 2021-04-15 | End: 2021-04-15

## 2021-04-15 RX ADMIN — TRANEXAMIC ACID 1500 MG: 1 INJECTION, SOLUTION INTRAVENOUS at 12:12

## 2021-04-15 RX ADMIN — ACETAMINOPHEN 650 MG: 325 TABLET ORAL at 23:11

## 2021-04-15 RX ADMIN — GABAPENTIN 300 MG: 300 CAPSULE ORAL at 21:36

## 2021-04-15 RX ADMIN — SUGAMMADEX 200 MG: 100 INJECTION, SOLUTION INTRAVENOUS at 14:51

## 2021-04-15 RX ADMIN — HYDROMORPHONE HYDROCHLORIDE 0.5 MG: 2 INJECTION, SOLUTION INTRAMUSCULAR; INTRAVENOUS; SUBCUTANEOUS at 13:02

## 2021-04-15 RX ADMIN — MIDAZOLAM HYDROCHLORIDE 2 MG: 2 INJECTION, SOLUTION INTRAMUSCULAR; INTRAVENOUS at 10:23

## 2021-04-15 RX ADMIN — ASPIRIN 81 MG: 81 TABLET, COATED ORAL at 21:36

## 2021-04-15 RX ADMIN — HYDROMORPHONE HYDROCHLORIDE 0.5 MG: 2 INJECTION, SOLUTION INTRAMUSCULAR; INTRAVENOUS; SUBCUTANEOUS at 12:33

## 2021-04-15 RX ADMIN — FENTANYL CITRATE 50 MCG: 50 INJECTION, SOLUTION INTRAMUSCULAR; INTRAVENOUS at 10:23

## 2021-04-15 RX ADMIN — SUCCINYLCHOLINE CHLORIDE 140 MG: 20 INJECTION, SOLUTION INTRAMUSCULAR; INTRAVENOUS; PARENTERAL at 11:57

## 2021-04-15 RX ADMIN — ONDANSETRON 4 MG: 2 INJECTION INTRAMUSCULAR; INTRAVENOUS at 14:24

## 2021-04-15 RX ADMIN — GLYCOPYRROLATE 0.3 MG: 0.2 INJECTION INTRAMUSCULAR; INTRAVENOUS at 12:07

## 2021-04-15 RX ADMIN — FENTANYL CITRATE 50 MCG: 50 INJECTION, SOLUTION INTRAMUSCULAR; INTRAVENOUS at 14:55

## 2021-04-15 RX ADMIN — ROSUVASTATIN CALCIUM 40 MG: 20 TABLET, FILM COATED ORAL at 18:32

## 2021-04-15 RX ADMIN — EPHEDRINE SULFATE 5 MG: 50 INJECTION INTRAVENOUS at 14:40

## 2021-04-15 RX ADMIN — FENTANYL CITRATE 50 MCG: 50 INJECTION, SOLUTION INTRAMUSCULAR; INTRAVENOUS at 14:54

## 2021-04-15 RX ADMIN — OXYCODONE 10 MG: 5 TABLET ORAL at 23:10

## 2021-04-15 RX ADMIN — FENTANYL CITRATE 50 MCG: 50 INJECTION, SOLUTION INTRAMUSCULAR; INTRAVENOUS at 11:53

## 2021-04-15 RX ADMIN — INSULIN LISPRO 4 UNITS: 100 INJECTION, SOLUTION INTRAVENOUS; SUBCUTANEOUS at 21:42

## 2021-04-15 RX ADMIN — ROCURONIUM BROMIDE 50 MG: 10 INJECTION INTRAVENOUS at 12:05

## 2021-04-15 RX ADMIN — SODIUM CHLORIDE, SODIUM LACTATE, POTASSIUM CHLORIDE, AND CALCIUM CHLORIDE: .6; .31; .03; .02 INJECTION, SOLUTION INTRAVENOUS at 11:49

## 2021-04-15 RX ADMIN — FENTANYL CITRATE 50 MCG: 50 INJECTION, SOLUTION INTRAMUSCULAR; INTRAVENOUS at 16:13

## 2021-04-15 RX ADMIN — Medication 0.3 MG: at 12:43

## 2021-04-15 RX ADMIN — SODIUM CHLORIDE: 9 INJECTION, SOLUTION INTRAVENOUS at 16:47

## 2021-04-15 RX ADMIN — EPHEDRINE SULFATE 5 MG: 50 INJECTION INTRAVENOUS at 14:27

## 2021-04-15 RX ADMIN — PROPOFOL 50 MG: 10 INJECTION, EMULSION INTRAVENOUS at 13:07

## 2021-04-15 RX ADMIN — HYDROMORPHONE HYDROCHLORIDE 1 MG: 2 INJECTION, SOLUTION INTRAMUSCULAR; INTRAVENOUS; SUBCUTANEOUS at 13:11

## 2021-04-15 RX ADMIN — PROPOFOL 50 MG: 10 INJECTION, EMULSION INTRAVENOUS at 12:02

## 2021-04-15 RX ADMIN — SODIUM CHLORIDE, POTASSIUM CHLORIDE, SODIUM LACTATE AND CALCIUM CHLORIDE: 600; 310; 30; 20 INJECTION, SOLUTION INTRAVENOUS at 10:20

## 2021-04-15 RX ADMIN — OXYCODONE 10 MG: 5 TABLET ORAL at 18:32

## 2021-04-15 RX ADMIN — ACETAMINOPHEN 650 MG: 325 TABLET ORAL at 18:32

## 2021-04-15 RX ADMIN — LIDOCAINE HYDROCHLORIDE 60 MG: 20 INJECTION, SOLUTION EPIDURAL; INFILTRATION; INTRACAUDAL; PERINEURAL at 11:53

## 2021-04-15 RX ADMIN — DEXAMETHASONE SODIUM PHOSPHATE 4 MG: 10 INJECTION, SOLUTION INTRAMUSCULAR; INTRAVENOUS at 12:59

## 2021-04-15 RX ADMIN — ROCURONIUM BROMIDE 30 MG: 10 INJECTION INTRAVENOUS at 13:08

## 2021-04-15 RX ADMIN — CEFAZOLIN 3000 MG: 10 INJECTION, POWDER, FOR SOLUTION INTRAVENOUS at 12:07

## 2021-04-15 RX ADMIN — LIDOCAINE HYDROCHLORIDE 40 MG: 20 INJECTION, SOLUTION EPIDURAL; INFILTRATION; INTRACAUDAL; PERINEURAL at 11:57

## 2021-04-15 RX ADMIN — CEFAZOLIN 3000 MG: 10 INJECTION, POWDER, FOR SOLUTION INTRAVENOUS at 21:36

## 2021-04-15 RX ADMIN — INSULIN GLARGINE 20 UNITS: 100 INJECTION, SOLUTION SUBCUTANEOUS at 21:43

## 2021-04-15 RX ADMIN — SODIUM CHLORIDE, SODIUM LACTATE, POTASSIUM CHLORIDE, AND CALCIUM CHLORIDE: .6; .31; .03; .02 INJECTION, SOLUTION INTRAVENOUS at 12:59

## 2021-04-15 RX ADMIN — PROPOFOL 150 MG: 10 INJECTION, EMULSION INTRAVENOUS at 11:57

## 2021-04-15 RX ADMIN — EPHEDRINE SULFATE 5 MG: 50 INJECTION INTRAVENOUS at 12:16

## 2021-04-15 RX ADMIN — TRANEXAMIC ACID 1500 MG: 1 INJECTION, SOLUTION INTRAVENOUS at 14:24

## 2021-04-15 ASSESSMENT — PAIN DESCRIPTION - DESCRIPTORS
DESCRIPTORS: ACHING
DESCRIPTORS: ACHING
DESCRIPTORS: ACHING;CONSTANT

## 2021-04-15 ASSESSMENT — PULMONARY FUNCTION TESTS
PIF_VALUE: 28
PIF_VALUE: 27
PIF_VALUE: 34
PIF_VALUE: 27
PIF_VALUE: 28
PIF_VALUE: 27
PIF_VALUE: 26
PIF_VALUE: 2
PIF_VALUE: 28
PIF_VALUE: 31
PIF_VALUE: 21
PIF_VALUE: 29
PIF_VALUE: 27
PIF_VALUE: 27
PIF_VALUE: 28
PIF_VALUE: 27
PIF_VALUE: 27
PIF_VALUE: 0
PIF_VALUE: 27
PIF_VALUE: 27
PIF_VALUE: 28
PIF_VALUE: 27
PIF_VALUE: 28
PIF_VALUE: 26
PIF_VALUE: 29
PIF_VALUE: 28
PIF_VALUE: 28
PIF_VALUE: 27
PIF_VALUE: 29
PIF_VALUE: 28
PIF_VALUE: 27
PIF_VALUE: 26
PIF_VALUE: 28
PIF_VALUE: 29
PIF_VALUE: 27
PIF_VALUE: 28
PIF_VALUE: 26
PIF_VALUE: 28
PIF_VALUE: 0
PIF_VALUE: 26
PIF_VALUE: 27
PIF_VALUE: 26
PIF_VALUE: 27
PIF_VALUE: 1
PIF_VALUE: 33
PIF_VALUE: 34
PIF_VALUE: 9
PIF_VALUE: 27
PIF_VALUE: 26
PIF_VALUE: 27
PIF_VALUE: 26
PIF_VALUE: 27
PIF_VALUE: 28
PIF_VALUE: 28
PIF_VALUE: 25
PIF_VALUE: 27
PIF_VALUE: 29
PIF_VALUE: 27
PIF_VALUE: 26
PIF_VALUE: 34
PIF_VALUE: 26
PIF_VALUE: 28
PIF_VALUE: 27
PIF_VALUE: 30
PIF_VALUE: 26
PIF_VALUE: 27
PIF_VALUE: 26
PIF_VALUE: 25
PIF_VALUE: 27
PIF_VALUE: 2
PIF_VALUE: 26
PIF_VALUE: 34
PIF_VALUE: 34
PIF_VALUE: 27
PIF_VALUE: 28
PIF_VALUE: 39
PIF_VALUE: 27
PIF_VALUE: 28
PIF_VALUE: 26
PIF_VALUE: 27
PIF_VALUE: 28
PIF_VALUE: 29
PIF_VALUE: 27
PIF_VALUE: 28
PIF_VALUE: 27
PIF_VALUE: 26
PIF_VALUE: 25
PIF_VALUE: 26
PIF_VALUE: 27
PIF_VALUE: 26
PIF_VALUE: 28
PIF_VALUE: 28
PIF_VALUE: 27
PIF_VALUE: 26
PIF_VALUE: 27
PIF_VALUE: 34
PIF_VALUE: 26
PIF_VALUE: 27
PIF_VALUE: 1
PIF_VALUE: 27
PIF_VALUE: 3
PIF_VALUE: 0
PIF_VALUE: 28
PIF_VALUE: 33

## 2021-04-15 ASSESSMENT — PAIN DESCRIPTION - FREQUENCY: FREQUENCY: CONTINUOUS

## 2021-04-15 ASSESSMENT — PAIN DESCRIPTION - ORIENTATION: ORIENTATION: RIGHT

## 2021-04-15 ASSESSMENT — PAIN DESCRIPTION - LOCATION: LOCATION: KNEE

## 2021-04-15 ASSESSMENT — PAIN DESCRIPTION - ONSET
ONSET: ON-GOING

## 2021-04-15 ASSESSMENT — LIFESTYLE VARIABLES: SMOKING_STATUS: 0

## 2021-04-15 ASSESSMENT — PAIN SCALES - GENERAL
PAINLEVEL_OUTOF10: 8
PAINLEVEL_OUTOF10: 7
PAINLEVEL_OUTOF10: 8
PAINLEVEL_OUTOF10: 7

## 2021-04-15 ASSESSMENT — PAIN - FUNCTIONAL ASSESSMENT: PAIN_FUNCTIONAL_ASSESSMENT: PREVENTS OR INTERFERES SOME ACTIVE ACTIVITIES AND ADLS

## 2021-04-15 ASSESSMENT — PAIN DESCRIPTION - PAIN TYPE: TYPE: SURGICAL PAIN

## 2021-04-15 ASSESSMENT — PAIN DESCRIPTION - PROGRESSION: CLINICAL_PROGRESSION: GRADUALLY WORSENING

## 2021-04-15 NOTE — PROGRESS NOTES
RESPIRATORY THERAPY ASSESSMENT    Name:  [address] [place] [address] Drive Record Number:  [id number]  Age: 64 y.o. Gender: male  : 1960  Today's Date:  4/15/2021  Room:  [address] [place] [address]-    Assessment     Is the patient being admitted for a COPD or Asthma exacerbation? No   (If yes the patient will be seen every 4 hours for the first 24 hours and then reassessed)    Patient Admission Diagnosis      Allergies  No Known Allergies    Minimum Predicted Vital Capacity:     1122         Actual Vital Capacity:      2000              Pulmonary History: asthma  Home Oxygen Therapy:  none  Home Respiratory Therapy: Albuterol/atrovent Q4H PRN   Current Respiratory Therapy:  Albuterol/atrovent Q4H PRN  Treatment Type: IS       Respiratory Severity Index(RSI)   Patients with orders for inhalation medications, oxygen, or any therapeutic treatment modality will be placed on Respiratory Protocol. They will be assessed with the first treatment and at least every 72 hours thereafter. The following severity scale will be used to determine frequency of treatment intervention.     Smoking History: Smoking History Less than 1ppd or less than 15 pack year = 1    Social History  Social History     Tobacco Use    Smoking status: Former Smoker     Years: 10.00     Quit date: 1998     Years since quittin.4    Smokeless tobacco: Never Used   Substance Use Topics    Alcohol use: No    Drug use: No       Recent Surgical History: Surgery of Extremities = 1  Past Surgical History  Past Surgical History:   Procedure Laterality Date    ANGIOPLASTY  2015    stent    CHOLECYSTECTOMY      HERNIA REPAIR      three    OTHER SURGICAL HISTORY N/A 10/20/2015    LAPAROSCOPIC SLEEVE GASTRECTOMY           OTHER SURGICAL HISTORY      interstim     bladder and bowel Right Back Hip    ME LAP GASTRIC BYPASS/HARJIT-EN-Y N/A 2018    LAPAROSCOPIC GASTRIC BYPASS WITH POSSIBLE HIATAL HERNIA REPAIR performed by Verona Amezcua DO at [address]  HARJIT-EN-Y GASTRIC BYPASS  11/19/2018    LAPAROSCOPIC GASTRIC BYPASS WITH POSSIBLE HIATAL HERNIA    STIMULATOR SURGERY      TESTICLE REMOVAL Right     TOTAL KNEE ARTHROPLASTY Right 4/15/2021    RIGHT TOTAL KNEE ARTHROPLASTY CARLOS performed by Reyes Gordillo MD at [address]       Level of Consciousness: Alert, Oriented, and Cooperative = 0    Level of Activity: Walking with assistance = 1    Respiratory Pattern: Regular Pattern; RR 8-20 = 0    Breath Sounds: Clear = 0    Sputum   ,  ,    Cough: Strong, spontaneous, non-productive = 0    Vital Signs   /82   Pulse 90   Temp 97.7 °F (36.5 °C) (Oral)   Resp 16   Ht 6' (1.829 m)   Wt 295 lb (133.8 kg)   SpO2 91%   BMI 40.01 kg/m²   SPO2 (COPD values may differ): Greater than or equal to 92% on room air = 0    Peak Flow (asthma only): not applicable = 0    RSI: 5-6 = Q4hr PRN (every four hours as needed) for dyspnea        Plan       Goals: medication delivery, mobilize retained secretions, volume expansion and improve oxygenation    Patient/caregiver was educated on the proper method of use for Respiratory Care Devices:  Yes      Level of patient/caregiver understanding able to:   ? Verbalize understanding   ? Demonstrate understanding       ? Teach back        ? Needs reinforcement       ? No available caregiver               ? Other:     Response to education:  Very Good     Is patient being placed on Home Treatment Regimen? Yes     Does the patient have everything they need prior to discharge? NA     Comments: Chart reviewed    Plan of Care: Continue Albuterol/atrovent Q4H PRN    Electronically signed by Shellie Tucker RCP on 4/15/2021 at 6:48 PM    Respiratory Protocol Guidelines     1. Assessment and treatment by Respiratory Therapy will be initiated for medication and therapeutic interventions upon initiation of aerosolized medication. 2. Physician will be contacted for respiratory rate (RR) greater than 35 breaths per minute.  Therapy will be held for heart rate (HR) greater than 140 beats per minute, pending direction from physician. 3. Bronchodilators will be administered via Metered Dose Inhaler (MDI) with spacer when the following criteria are met:  a. Alert and cooperative     b. HR < 140 bpm  c. RR < 30 bpm                d. Can demonstrate a 2-3 second inspiratory hold  4. Bronchodilators will be administered via Hand Held Nebulizer [facility]) to patients when ANY of the following criteria are met  a. Incognizant or uncooperative          b. Patients treated with HHN at Home        c. Unable to demonstrate proper use of MDI with spacer     d. RR > 30 bpm   5. Bronchodilators will be delivered via Metered Dose Inhaler (MDI), HHN, Aerogen to intubated patients on mechanical ventilation. 6. Inhalation medication orders will be delivered and/or substituted as outlined below. Aerosolized Medications Ordering and Administration Guidelines:    1. All Medications will be ordered by a physician, and their frequency and/or modality will be adjusted as defined by the patients Respiratory Severity Index (RSI) score. 2. If the patient does not have documented COPD, consider discontinuing anticholinergics when RSI is less than 9.  3. If the bronchospasm worsens (increased RSI), then the bronchodilator frequency can be increased to a maximum of every 4 hours. If greater than every 4 hours is required, the physician will be contacted. 4. If the bronchospasm improves, the frequency of the bronchodilator can be decreased, based on the patient's RSI, but not less than home treatment regimen frequency. 5. Bronchodilator(s) will be discontinued if patient has a RSI less than 9 and has received no scheduled or as needed treatment for 72  Hrs. Patients Ordered on a Mucolytic Agent:    1. Must always be administered with a bronchodilator.     2. Discontinue if patient experiences worsened bronchospasm, or secretions have lessened to the point that the patient is able to clear them with a cough. Anti-inflammatory and Combination Medications:    1. If the patient lacks prior history of lung disease, is not using inhaled anti-inflammatory medication at home, and lacks wheezing by examination or by history for at least 24 hours, contact physician for possible discontinuation.

## 2021-04-15 NOTE — PROGRESS NOTES
PATIENT'S FREE STYLE KARSTEN GLUCOSE MONITOR BROUGHT TO OR PER THE REQUEST OF ANESTHESIA (J. KIKE CRNA) FOR INTRAOPERATIVE BLOOD GLUCOSE MONITORING.

## 2021-04-15 NOTE — FLOWSHEET NOTE
Report is given to receiving VINAYAK Lerma from . [place] Border Text: The closure involved the vermilion border.

## 2021-04-15 NOTE — OP NOTE
ORTHOPAEDIC OPERATIVE NOTE    PATIENT NAME Royce Santillan    64 y.o. male    SURGEON: Elizabeth Elam M.D. SURGERY DATE: 4/15/2021 3:05 PM    PRE-OPERATIVE DIAGNOSIS: right knee osteoarthritis    POST-OPERATIVE DIAGNOSIS: right knee osteoarthritis    PROCEDURE PERFORMED: right total knee replacement using robotic assistance [facility] Robot); all three components were cemented ; posterior cruciate retaining implants. Medial parapatellar arthrotomy. Implants used:  NETTA TRIATHALON TKR SYSTEM WITH:    Size 7 CR Femoral component                          Size 7 universal tibial baseplate                          Size 9 mm X3 polyethylene CS tibial insert                          Size 39 x 11 mm symmetric patellar component    ANESTHESIA: general and regional    COMPLICATIONS: None apparent. POST-OP CONDITION:  To recovery room. EBL: 200 cc    ANTIBIOTIC: 2g Ancef preop    INDICATIONS FOR SURGERY: The patient is a 64y.o.-year-old male with a long history of knee pain affecting the activities of daily living. Pt had severe pain during ambulation and activities of daily life. The pain was refractory to conservative management including injections (corticosteroid/viscosupplementation); PT, Ambulatory aids; oral medication (NSAIDs and pain medication). Preop workup showed radiographic changes with osteophyte formation; loss of cartilage space; subchondral sclerosis and deformity. The patient is scheduled for a right total knee replacement. The risks, benefits and alternatives of surgery were clearly discussed and the patient wished to proceed with surgery. OPERATIVE FINDINGS: severe osteoarthritis with tricompartmental involvement and varus deformity. Complete loss of articular cartilage, osteophyte formation and severe synovitis. DETAILS OF PROCEDURE: The patient was met in the preop holding area and consents were reviewed. Operative site was signed.       The patient was brought to the operating room, and after administration of a general anesthetic and femoral nerve block, was placed  on the OR table in the supine position. The lower extremity was prepped and draped in normal sterile fashion. Patient was given prophylactic antibiotics. Time-out was performed confirming the appropriate site and side. The limb was exsanguinated. Tourniquet was inflated to 300 mmHg. A straight midline incision was made. Median parapatellar arthrotomy was made. The patella was subluxated laterally. The menisci and ACL were resected. The femoral and tibial tracker pins were placed in a bicortical fashion in the proximal tibial and distal femur. The [employer] robot from [employer] was used to input our patient registration and anatomical survey into the computer. Initial kinematic survey showed the patient to have persistent significant varus tracking starting from 25 degrees of extension to approximately 100 degrees of flexion. Preoperative templated sizes and alignment were confirmed preoperatively using a size 7 femoral component, a size 7 tibial tray and a size 9 insert. Ligament balancing was then performed using our robotic software. Next, the robotic interactive orthopedic arm was brought into the field. It was registered. Our tibial cut was first made. Next, our distal femoral cut, our anterior and posterior chamfer cuts and anterior and posterior cuts were then made. Our femoral trial and tibial trial were the placed along with an insert. The patella was resurfaced using a symmetric patella component using our reamer system for our  patella. Trials were then placed. The knee was put through a full range of motion noting full extension and to at least 120 degrees of knee flexion with balance flexion and extension gap and neutral alignment to approximately 2-3 degrees of varus throughout the entire range of motion.   Ligament balancing was then performed noting excellent ligament balance both medial and laterally. The patella tracked centrally. Next, the posterior osteophytes and meniscal remnants were resected. The pain cocktail was injected into the posterior capsule of the medial and lateral gutters. Final range of motion: 0 extension to 120 degrees of flexion. Next all trials were removed. The femoral and tibia array pins were removed. The cut bony surfaces were irrigated with copious amounts of irrigation solution. The tibial, femoral and patellar components were cemented in place. The insert was then placed. The knee was held in extension. The knee was irrigated with 5 liters of irrigation solution using pulsatile lavage. The tourniquet was let down before closure. Any bleeding vessels were coagulated using the Bovie cautery. The knee was then closed in layers using Stratafix to close the extensor mechanism, a combination of 0- and 2-0 Vicryl to close the subcutaneous tissue and 4-0 Monocryl to close the skin. Prineo was applied to seal the wound and allowed to dry before a dry sterile compression dressing was applied. The patient was then taken to recovery in stable condition having tolerated the procedure well.

## 2021-04-15 NOTE — PROGRESS NOTES
Patient is alert and oriented x4. Vital signs stable, on room air. Endorsing pain to R knee and rating it an 8/10. Decreased sensation to RLE but able to wiggle toes and dorsi/plantar flex. Surgical site to R knee covered with ace wrap and immobilizer. Coban to ulcer on L foot that was cleaned and dressed during surgery. Clear breath sounds, active bowel tones. Tolerating diet well, denies n/v. Stood at bedside with x2 assist, gait belt and walker. Was not able to void yet. Assisted back to bed, repositioned, bed alarm engaged, and call light within reach. Patient educated on fall precautions. Wife at bedside. All needs met. Will continue to monitor.

## 2021-04-15 NOTE — H&P
Cara Beckwith    [id number]    [facility] Same Day Surgery Update H & P  Department of General Surgery   Surgical Service   Pre-operative History and Physical  Last H & P within the last 30 days. DIAGNOSIS:   Unilateral primary osteoarthritis, right knee [M17.11]    Procedure(s):  RIGHT TOTAL KNEE ARTHROPLASTY CARLOS     HISTORY OF PRESENT ILLNESS:   Patient is a morbidly obese (Body mass index is 40.01 kg/m².), 64 y.o. male with chronic right  knee pain, swelling and instability in the setting of arthrosis. The symptoms have been recalcitrant to conservative treatment and the patient presents today for the above procedure. Covid 19:  Patient denies fever, chills, cough or known exposure to Covid-19.       Past Medical History:        Diagnosis Date    Adopted     Arthritis     Asthma     CAD (coronary artery disease)     Depression     Diabetes mellitus (HCC)     GERD (gastroesophageal reflux disease)     Hyperlipidemia     MI, old 2010    Obesity     Urinary frequency     Urinary urgency     Vitamin D deficiency      Past Surgical History:        Procedure Laterality Date    ANGIOPLASTY  sept 2015    stent    CHOLECYSTECTOMY      HERNIA REPAIR      three    OTHER SURGICAL HISTORY N/A 10/20/2015    LAPAROSCOPIC SLEEVE GASTRECTOMY           OTHER SURGICAL HISTORY      interstim     bladder and bowel Right Back Hip    [place] LAP GASTRIC BYPASS/HARJIT-EN-Y N/A 11/19/2018    LAPAROSCOPIC GASTRIC BYPASS WITH POSSIBLE HIATAL HERNIA REPAIR performed by Ramila Garrett DO at [address] [place] [address]  11/19/2018    LAPAROSCOPIC GASTRIC BYPASS WITH POSSIBLE HIATAL HERNIA    STIMULATOR SURGERY      TESTICLE REMOVAL Right      Past Social History:  Social History     Socioeconomic History    Marital status:      Spouse name: Not on file    Number of children: Not on file    Years of education: Not on file    Highest education level: Not on file   Occupational History    Not on file   Social Needs    Financial resource strain: Not on file    Food insecurity     Worry: Not on file     Inability: Not on file    Transportation needs     Medical: Not on file     Non-medical: Not on file   Tobacco Use    Smoking status: Former Smoker     Years: 10.00     Quit date: 1998     Years since quittin.4    Smokeless tobacco: Never Used   Substance and Sexual Activity    Alcohol use: No    Drug use: No    Sexual activity: Not on file   Lifestyle    Physical activity     Days per week: Not on file     Minutes per session: Not on file    Stress: Not on file   Relationships    Social connections     Talks on phone: Not on file     Gets together: Not on file     Attends [employer] service: Not on file     Active member of club or organization: Not on file     Attends meetings of clubs or organizations: Not on file     Relationship status: Not on file    Intimate partner violence     Fear of current or ex partner: Not on file     Emotionally abused: Not on file     Physically abused: Not on file     Forced sexual activity: Not on file   Other Topics Concern    Not on file   Social History Narrative    Not on file         Medications Prior to Admission:      Prior to Admission medications    Medication Sig Start Date End Date Taking? Authorizing Provider   gabapentin (NEURONTIN) 300 MG capsule Take 300 mg by mouth 2 times daily. Yes Historical Provider, MD   magnesium oxide (MAG-OX) 400 MG tablet magnesium 400 mg (as magnesium oxide) tablet   Take 1 tablet every day by oral route.  10/23/20  Yes Historical Provider, MD   Liraglutide (VICTOZA) 18 MG/3ML SOPN SC injection Inject 1.8 mg into the skin daily 21  Yes Historical Provider, MD   ferrous sulfate (FE TABS 325) 325 (65 Fe) MG EC tablet Take 325 mg by mouth daily 10/23/20  Yes Historical Provider, MD   cyanocobalamin 1000 MCG tablet Take 1,000 mcg by mouth daily 10/23/20  Yes Historical Provider, MD   aspirin 81 MG EC tablet aspirin 81 mg tablet,delayed release   Take 1 tablet every day by oral route. Yes Historical Provider, MD   vitamin D3 (CHOLECALCIFEROL) 125 MCG (5000 UT) TABS tablet Take 10,000 Units by mouth daily 10/27/20  Yes Historical Provider, MD   potassium chloride (KLOR-CON M) 20 MEQ extended release tablet Take 20 mEq by mouth daily   Yes Historical Provider, MD   insulin lispro (HUMALOG) 100 UNIT/ML injection vial Inject into the skin 3 times daily as needed for High Blood Sugar   Yes Historical Provider, MD   psyllium (KONSYL) 28.3 % PACK Take 1 packet by mouth daily   Yes Historical Provider, MD   insulin glargine (TOUJEO SOLOSTAR) 300 UNIT/ML injection pen Inject 25 Units into the skin nightly 11/20/18  Yes ALEX Patel CNP   lisinopril (PRINIVIL;ZESTRIL) 2.5 MG tablet Take 2.5 mg by mouth daily   Yes Historical Provider, MD   metFORMIN (GLUCOPHAGE) 500 MG tablet Take 1,000 mg by mouth 2 times daily (with meals)    Yes Historical Provider, MD   pantoprazole (PROTONIX) 40 MG tablet Take 40 mg by mouth daily   Yes Historical Provider, MD   rosuvastatin (CRESTOR) 40 MG tablet Take 40 mg by mouth every evening   Yes Historical Provider, MD   sertraline (ZOLOFT) 100 MG tablet Take 100 mg by mouth daily   Yes Historical Provider, MD   albuterol sulfate  (90 Base) MCG/ACT inhaler Inhale 2 puffs into the lungs every 4 hours as needed 11/21/20   Historical Provider, MD   ipratropium-albuterol (DUONEB) 0.5-2.5 (3) MG/3ML SOLN nebulizer solution Inhale 3 mLs into the lungs every 6 hours as needed 11/21/20   Historical Provider, MD         Allergies:  Patient has no known allergies.     PHYSICAL EXAM:      BP (!) 140/78   Pulse (S) 70   Temp 97.8 °F (36.6 °C) (Oral)   Resp 20   Ht 6' (1.829 m)   Wt 295 lb (133.8 kg)   SpO2 95%   BMI 40.01 kg/m²      Airway:  Airway patent with no audible stridor    Heart:  Regular rate and rhythm, No murmur noted    Lungs:  No increased work of breathing, good air exchange, clear to auscultation bilaterally, no crackles or wheezing    Abdomen:  Soft, non-distended, non-tender, normal active bowel sounds, no masses palpated    ASSESSMENT AND PLAN    Patient is a 64 y.o. male with above specified procedure planned. 1.  The patients history and physical was obtained and signed off by the pre-admission testing department. Patient seen and focused exam done today- no new changes since last physical exam on 4/7/21    2. Access to ancillary services are available per request of the provider. Kimberly Leonard, APRN - CNP     4/15/2021     ADDENDUM    Right leg demonstrates no open lesions. Left foot - 15 mm clean midfoot plantar ulcer which demonstrates no signs of infection. No significant depth to ulcer. I explained to the patient that with any open lesion, there is a greater chance of periprosthetic joint infection, which is a catastrophic complication that necessitates repeat surgery or possibly staged operations. He understands this risk and after reviewing the risks, benefits and alternatives to surgery he elects to proceed.

## 2021-04-15 NOTE — ANESTHESIA PRE PROCEDURE
Department of Anesthesiology  Preprocedure Note       Name:  Jamarcus Perez   Age:  64 y.o.  :  1960                                          MRN:  [id number]         Date:  4/15/2021      Surgeon: Nati Jones):  Romulo Horne MD    Procedure: RIGHT TOTAL KNEE ARTHROPLASTY CARLOS (Right Knee)    Medications prior to admission:   Prior to Admission medications    Medication Sig Start Date End Date Taking? Authorizing Provider   albuterol sulfate  (90 Base) MCG/ACT inhaler Inhale 2 puffs into the lungs every 4 hours as needed 20   Historical Provider, MD   magnesium oxide (MAG-OX) 400 MG tablet magnesium 400 mg (as magnesium oxide) tablet   Take 1 tablet every day by oral route. 10/23/20   Historical Provider, MD   Liraglutide (VICTOZA) 18 MG/3ML SOPN SC injection Inject 1.8 mg into the skin daily 21   Historical Provider, MD   ipratropium-albuterol (DUONEB) 0.5-2.5 (3) MG/3ML SOLN nebulizer solution Inhale 3 mLs into the lungs every 6 hours as needed 20   Historical Provider, MD   ferrous sulfate (FE TABS 325) 325 (65 Fe) MG EC tablet Take 325 mg by mouth daily 10/23/20   Historical Provider, MD   cyanocobalamin 1000 MCG tablet Take 1,000 mcg by mouth daily 10/23/20   Historical Provider, MD   aspirin 81 MG EC tablet aspirin 81 mg tablet,delayed release   Take 1 tablet every day by oral route.     Historical Provider, MD   vitamin D3 (CHOLECALCIFEROL) 125 MCG (5000 UT) TABS tablet Take 10,000 Units by mouth daily 10/27/20   Historical Provider, MD   potassium chloride (KLOR-CON M) 20 MEQ extended release tablet Take 20 mEq by mouth daily    Historical Provider, MD   insulin lispro (HUMALOG) 100 UNIT/ML injection vial Inject into the skin 3 times daily as needed for High Blood Sugar    Historical Provider, MD   psyllium (KONSYL) 28.3 % PACK Take 1 packet by mouth daily    Historical Provider, MD   insulin glargine (TOUJEO SOLOSTAR) 300 UNIT/ML injection pen Inject 25 Units into the disease)     Depression     Diabetes mellitus ([facility] [address]     GERD (gastroesophageal reflux disease)     Hyperlipidemia     MI, old     Obesity     Urinary frequency     Urinary urgency     Vitamin D deficiency        Past Surgical History:        Procedure Laterality Date    ANGIOPLASTY  2015    stent    CHOLECYSTECTOMY      HERNIA REPAIR      three    OTHER SURGICAL HISTORY N/A 10/20/2015    LAPAROSCOPIC SLEEVE GASTRECTOMY           OTHER SURGICAL HISTORY      interstim     bladder and bowel Right Back Hip    [place] LAP GASTRIC BYPASS/HARJIT-EN-Y N/A 2018    LAPAROSCOPIC GASTRIC BYPASS WITH POSSIBLE HIATAL HERNIA REPAIR performed by Alexis Nielsen DO at [address] [place] [address]  2018    LAPAROSCOPIC GASTRIC BYPASS WITH POSSIBLE HIATAL HERNIA    STIMULATOR SURGERY      TESTICLE REMOVAL Right        Social History:    Social History     Tobacco Use    Smoking status: Former Smoker     Years: 10.00     Quit date: 1998     Years since quittin.4    Smokeless tobacco: Never Used   Substance Use Topics    Alcohol use: No                                Counseling given: Not Answered      Vital Signs (Current): There were no vitals filed for this visit.                                            BP Readings from Last 3 Encounters:   18 130/70   18 (!) 156/74       NPO Status:                                                                                 BMI:   Wt Readings from Last 3 Encounters:   21 295 lb (133.8 kg)   18 296 lb (134.3 kg)   10/19/15 (!) 355 lb (161 kg)     There is no height or weight on file to calculate BMI.    CBC:   Lab Results   Component Value Date    WBC 10.8 2018    RBC 4.62 2018    HGB 11.9 2018    HCT 36.2 2018    MCV 86.8 2018    RDW 14.2 2018     2018       CMP:   Lab Results   Component Value Date     2018    K 4.6 2018     2018    CO2 25 11/20/2018    BUN 22 11/20/2018    CREATININE 0.8 11/20/2018    GFRAA >60 11/20/2018    LABGLOM >60 11/20/2018    GLUCOSE 216 11/20/2018    CALCIUM 9.5 11/20/2018       POC Tests: No results for input(s): POCGLU, POCNA, POCK, POCCL, POCBUN, POCHEMO, POCHCT in the last 72 hours. Coags: No results found for: PROTIME, INR, APTT    HCG (If Applicable): No results found for: PREGTESTUR, PREGSERUM, HCG, HCGQUANT     ABGs: No results found for: PHART, PO2ART, IQL1GGF, WTT2ZFJ, BEART, G4WBPSKL     Type & Screen (If Applicable):  No results found for: LABABO, [address]    Anesthesia Evaluation  Patient summary reviewed and Nursing notes reviewed no history of anesthetic complications:   Airway: Mallampati: III  TM distance: >3 FB   Neck ROM: full  Mouth opening: > = 3 FB Dental: normal exam         Pulmonary:normal exam  breath sounds clear to auscultation  (+) decreased breath sounds,  asthma (rarely needs inhaler):     (-) sleep apnea and not a current smoker                           Cardiovascular:Negative CV ROS  Exercise tolerance: poor (<4 METS),   (+) hypertension: mild, past MI:, CAD: no interval change, hyperlipidemia        Rhythm: regular  Rate: normal                 ROS comment: Stent 2010 X 1  No complaints of Chest Pain     Neuro/Psych:   (+) depression/anxiety    (-) seizures and CVA           GI/Hepatic/Renal:   (+) hiatal hernia, GERD: well controlled, morbid obesity          Endo/Other: Negative Endo/Other ROS   (+) DiabetesType II DM, well controlled, using insulin, . ROS comment: Vitamin D deficiency Abdominal:           Vascular: negative vascular ROS. Anesthesia Plan      general and regional     ASA 3     (-npo [place]  -heart stent 2010 RCA, repeat cat 2015 w mild LAD disease, stress test done in Feb wnl, cardiac clearance letter on chart  )  Induction: intravenous. MIPS: Postoperative opioids intended.   Anesthetic plan and risks discussed with patient. Plan discussed with CRNA.     Attending anesthesiologist reviewed and agrees with Pre Eval content            Edmund Robles MD   4/15/2021

## 2021-04-15 NOTE — CONSULTS
Hospital Medicine Initial Consultation    PCP: Neel Powell MD    Date of Admission: 4/15/2021    Date of Service: 4/15/2021    Pt seen/examined on 4/15/2021    Consulting Physician: Dr. Nicola Angelo: Orthopedic surgery    Chief Complaint:  Knee pain    History Of Present Illness:      64 y.o. male who presented to [facility], [facility]. with chronic knee pain a/w swelling that has been persistent despite conservative treatment. We are consulted for medical management after the following procedure:  right total knee replacement using robotic assistance [facility] Robot); all three components were cemented ; posterior cruciate retaining implants. Medial parapatellar arthrotomy. Past Medical History:          Diagnosis Date    Adopted     Arthritis     Asthma     CAD (coronary artery disease)     Depression     Diabetes mellitus (HCC)     GERD (gastroesophageal reflux disease)     Hyperlipidemia     MI, old 2010    Obesity     Urinary frequency     Urinary urgency     Vitamin D deficiency        Past Surgical History:          Procedure Laterality Date    ANGIOPLASTY  sept 2015    stent    CHOLECYSTECTOMY      HERNIA REPAIR      three    OTHER SURGICAL HISTORY N/A 10/20/2015    LAPAROSCOPIC SLEEVE GASTRECTOMY           OTHER SURGICAL HISTORY      interstim     bladder and bowel Right Back Hip    OR LAP GASTRIC BYPASS/HARJIT-EN-Y N/A 11/19/2018    LAPAROSCOPIC GASTRIC BYPASS WITH POSSIBLE HIATAL HERNIA REPAIR performed by Lynsey Guy DO at [address] [facility]  11/19/2018    LAPAROSCOPIC GASTRIC BYPASS WITH POSSIBLE HIATAL HERNIA    STIMULATOR SURGERY      TESTICLE REMOVAL Right     TOTAL KNEE ARTHROPLASTY Right 4/15/2021    RIGHT TOTAL KNEE ARTHROPLASTY CARLOS performed by Melida Mendoza MD at [address]       Medications Prior to Admission:      Prior to Admission medications    Medication Sig Start Date End Date Taking?  Authorizing Provider   gabapentin (NEURONTIN) 300 MG capsule Take 300 mg by mouth 2 times daily. Yes Historical Provider, MD   magnesium oxide (MAG-OX) 400 MG tablet magnesium 400 mg (as magnesium oxide) tablet   Take 1 tablet every day by oral route. 10/23/20  Yes Historical Provider, MD   Liraglutide (VICTOZA) 18 MG/3ML SOPN SC injection Inject 1.8 mg into the skin daily 2/23/21  Yes Historical Provider, MD   ferrous sulfate (FE TABS 325) 325 (65 Fe) MG EC tablet Take 325 mg by mouth daily 10/23/20  Yes Historical Provider, MD   cyanocobalamin 1000 MCG tablet Take 1,000 mcg by mouth daily 10/23/20  Yes Historical Provider, MD   aspirin 81 MG EC tablet aspirin 81 mg tablet,delayed release   Take 1 tablet every day by oral route.    Yes Historical Provider, MD   vitamin D3 (CHOLECALCIFEROL) 125 MCG (5000 UT) TABS tablet Take 10,000 Units by mouth daily 10/27/20  Yes Historical Provider, MD   potassium chloride (KLOR-CON M) 20 MEQ extended release tablet Take 20 mEq by mouth daily   Yes Historical Provider, MD   insulin lispro (HUMALOG) 100 UNIT/ML injection vial Inject into the skin 3 times daily as needed for High Blood Sugar   Yes Historical Provider, MD   psyllium (KONSYL) 28.3 % PACK Take 1 packet by mouth daily   Yes Historical Provider, MD   insulin glargine (TOUJEO SOLOSTAR) 300 UNIT/ML injection pen Inject 25 Units into the skin nightly 11/20/18  Yes ALEX De La Rosa - CNP   lisinopril (PRINIVIL;ZESTRIL) 2.5 MG tablet Take 2.5 mg by mouth daily   Yes Historical Provider, MD   metFORMIN (GLUCOPHAGE) 500 MG tablet Take 1,000 mg by mouth 2 times daily (with meals)    Yes Historical Provider, MD   pantoprazole (PROTONIX) 40 MG tablet Take 40 mg by mouth daily   Yes Historical Provider, MD   rosuvastatin (CRESTOR) 40 MG tablet Take 40 mg by mouth every evening   Yes Historical Provider, MD   sertraline (ZOLOFT) 100 MG tablet Take 100 mg by mouth daily   Yes Historical Provider, MD   albuterol sulfate  (90 Base) MCG/ACT inhaler Inhale 2 puffs into the lungs every 4 hours as needed 11/21/20   Historical Provider, MD   ipratropium-albuterol (DUONEB) 0.5-2.5 (3) MG/3ML SOLN nebulizer solution Inhale 3 mLs into the lungs every 6 hours as needed 11/21/20   Historical Provider, MD       Allergies:  Patient has no known allergies. Social History:      TOBACCO:   reports that he quit smoking about 22 years ago. He quit after 10.00 years of use. He has never used smokeless tobacco.  ETOH:   reports no history of alcohol use. Family History:      Reviewed in detail and negative except as follows:    No family history on file. REVIEW OF SYSTEMS:   Pertinent positives and negatives as noted in the HPI. All other systems reviewed and negative. PHYSICAL EXAM PERFORMED:    /82   Pulse 90   Temp 97.7 °F (36.5 °C) (Oral)   Resp 16   Ht 6' (1.829 m)   Wt 295 lb (133.8 kg)   SpO2 91%   BMI 40.01 kg/m²     General appearance:  No apparent distress, appears stated age  Eyes: Pupils equal, round, reactive to light, conjunctiva/corneas clear  Ears/Nose/Mouth/Throat: No external lesions or scars, hearing intact to voice  Neck: Trachea midline, no masses noted, no thyromegaly  Respiratory:  Normal respiratory effort. Clear to auscultation bilaterally  Cardiovascular: Regular rate and rhythm, nl S1/S2, w/o murmurs, rubs or gallops, no lower extremity edema  Abdomen: Soft, non-tender, non-distended, no hepatosplenomegaly  Musculoskeletal: No cyanosis, clubbing or petechiae, no lower extremity misalignment, asymmetry, or crepitation  Skin: Normal skin color, texture, turgor. No rashes or lesions noted. Psychiatric: Alert and oriented x4, good insight and judgment    Labs:     No results for input(s): WBC, HGB, HCT, PLT in the last 72 hours. No results for input(s): NA, K, CL, CO2, BUN, CREATININE, CALCIUM, PHOS in the last 72 hours.     Invalid input(s): MICHAEL  Recent Labs     04/15/21  1010   AST 41*   ALT 27   BILIDIR <0.2   BILITOT 0.4   ALKPHOS 77     Recent Labs     04/15/21  1010   INR 1.07     No results for input(s): CKTOTAL, TROPONINI in the last 72 hours. Urinalysis:    No results found for: Cathalene Bookman, BACTERIA, RBCUA, BLOODU, SPECGRAV, Fausto [place] [address]    Radiology:    XR KNEE RIGHT (1-2 VIEWS)   Final Result   Impression:    Status post total knee arthroplasty. ASSESSMENT:    Active Hospital Problems    Diagnosis Date Noted    Primary osteoarthritis of right knee [M17.11] 04/15/2021       PLAN:    # right total knee replacement using robotic assistance [facility] Robot); all three components were cemented ; posterior cruciate retaining implants. Medial parapatellar arthrotomy.  -management as per primary    # T2DM  -carb control diet  -hold metformin  -lantus w/ mealtime and correctional insulin  -continue gabapentin  -plan to dc on home regimen    # Asthma  -continue home inhalers  -breathing treatments PRN    # HLD  -continue statin    # Depression  -continue sertraline    # CAD  -continue asa    # Morbid obesity  -Complicating assessment and treatment. Placing patient at risk for multiple co-morbidities as well as early death and contributing to the patient's presentation. Counseled on weight loss. # Other chronic conditions  -continue home management    DVT Prophylaxis: per primary  Diet: DIET CARB CONTROL;  Code Status: Full Code    PT/OT Eval Status: per primary    Dispo - per primary, no barriers to dc from internal medicine perspective       Yahir Shah MD    Thank you Dr. Naa Laura for the opportunity to be involved in this patient's care.  If you have any questions or concerns please feel free to contact me at [phone]

## 2021-04-15 NOTE — FLOWSHEET NOTE
PACU Transfer Note    Vitals:    04/15/21 1700   BP: (!) 143/89   Pulse: 96   Resp: 18   Temp: 97.4 °F (36.3 °C)   SpO2: 92%     Bp within 20% of admission. In: 340 [P.O.:200; I.V.:140]  Out: 150     Pain assessment:  present - adequately treated and receiving treatment. Patient is satisfied with pain level. Pain Level: 7    Report given to Receiving unit VINAYAK Willis here in PACU. Patient transported with all his belongings, home glucose monitor Freestyle Gila per PACU transporter. Call placed to his wife for update and make aware of room assignment.     4/15/2021 5:16 PM

## 2021-04-15 NOTE — ANESTHESIA POSTPROCEDURE EVALUATION
Department of Anesthesiology  Postprocedure Note    Patient: Shaji Herman  MRN: [id number]  [place]: 1960  Date of evaluation: 4/15/2021  Time:  4:03 PM     Procedure Summary     Date: 04/15/21 Room / Location: [facility] [address]    Anesthesia Start: 1147 Anesthesia Stop: 2108    Procedure: RIGHT TOTAL KNEE ARTHROPLASTY CARLOS (Right Knee) Diagnosis:       Unilateral primary osteoarthritis, right knee      (Unilateral primary osteoarthritis, right knee [M17.11])    Surgeons: Juan Haynes MD Responsible Provider: Adelina Lazo MD    Anesthesia Type: general, regional ASA Status: 3          Anesthesia Type: general, regional    Matias Phase I: Matias Score: 7    Matias Phase II:      Last vitals: Reviewed and per EMR flowsheets.        Anesthesia Post Evaluation    Patient location during evaluation: PACU  Patient participation: complete - patient participated  Level of consciousness: awake and alert  Pain score: 0  Airway patency: patent  Nausea & Vomiting: no nausea and no vomiting  Complications: no  Cardiovascular status: hemodynamically stable  Respiratory status: acceptable  Hydration status: euvolemic

## 2021-04-15 NOTE — ANESTHESIA PROCEDURE NOTES
Peripheral Block    Patient location during procedure: pre-op  Start time: 4/15/2021 10:23 AM  End time: 4/15/2021 10:30 AM  Staffing  Performed: anesthesiologist and other anesthesia staff   Anesthesiologist: Otis Villavicencio MD  Other anesthesia staff: Marilynn Lovett RN  Preanesthetic Checklist  Completed: patient identified, IV checked, site marked, risks and benefits discussed, surgical consent, monitors and equipment checked, pre-op evaluation, timeout performed, anesthesia consent given, oxygen available and patient being monitored  Peripheral Block  Patient position: supine  Prep: ChloraPrep  Patient monitoring: continuous pulse ox, frequent blood pressure checks, IV access and cardiac monitor  Block type: Femoral  Laterality: right  Injection technique: single-shot  Guidance: ultrasound guided  Local infiltration: ropivacaine and decadron  Infiltration strength: 0.5 %  Dose: 30 mL  Adductor canal  Provider prep: mask and sterile gloves  Local infiltration: ropivacaine and decadron  Needle  Needle type: combined needle/nerve stimulator   Needle gauge: 20 G  Needle localization: ultrasound guidance  Assessment  Injection assessment: negative aspiration for heme, no paresthesia on injection and local visualized surrounding nerve on ultrasound  Slow fractionated injection: yes  Hemodynamics: stable  Reason for block: post-op pain management

## 2021-04-15 NOTE — PROGRESS NOTES
Dr Yanick Coto in to sign correct side for surgery and viewed ulcer on sole of  left foot . Area is a dime size area which is about 1/4 inch deep and is pink in color on fore foot.    Surgeon said it would be redressed in the OR

## 2021-04-16 LAB
ANION GAP SERPL CALCULATED.3IONS-SCNC: 11 MMOL/L (ref 3–16)
BUN BLDV-MCNC: 21 MG/DL (ref 7–20)
CALCIUM SERPL-MCNC: 8.7 MG/DL (ref 8.3–10.6)
CHLORIDE BLD-SCNC: 104 MMOL/L (ref 99–110)
CO2: 23 MMOL/L (ref 21–32)
CREAT SERPL-MCNC: 1.2 MG/DL (ref 0.9–1.3)
GFR AFRICAN AMERICAN: >60
GFR NON-AFRICAN AMERICAN: >60
GLUCOSE BLD-MCNC: 176 MG/DL (ref 70–99)
GLUCOSE BLD-MCNC: 198 MG/DL (ref 70–99)
GLUCOSE BLD-MCNC: 243 MG/DL (ref 70–99)
GLUCOSE BLD-MCNC: 272 MG/DL (ref 70–99)
GLUCOSE BLD-MCNC: 306 MG/DL (ref 70–99)
HCT VFR BLD CALC: 32.8 % (ref 40.5–52.5)
HEMOGLOBIN: 10.7 G/DL (ref 13.5–17.5)
MCH RBC QN AUTO: 28.6 PG (ref 26–34)
MCHC RBC AUTO-ENTMCNC: 32.6 G/DL (ref 31–36)
MCV RBC AUTO: 87.5 FL (ref 80–100)
PDW BLD-RTO: 16 % (ref 12.4–15.4)
PERFORMED ON: ABNORMAL
PLATELET # BLD: 183 K/UL (ref 135–450)
PMV BLD AUTO: 8.7 FL (ref 5–10.5)
POTASSIUM SERPL-SCNC: 4.7 MMOL/L (ref 3.5–5.1)
RBC # BLD: 3.75 M/UL (ref 4.2–5.9)
SODIUM BLD-SCNC: 138 MMOL/L (ref 136–145)
WBC # BLD: 14.8 K/UL (ref 4–11)

## 2021-04-16 PROCEDURE — 2580000003 HC RX 258: Performed by: ORTHOPAEDIC SURGERY

## 2021-04-16 PROCEDURE — 97162 PT EVAL MOD COMPLEX 30 MIN: CPT

## 2021-04-16 PROCEDURE — 85027 COMPLETE CBC AUTOMATED: CPT

## 2021-04-16 PROCEDURE — 97116 GAIT TRAINING THERAPY: CPT

## 2021-04-16 PROCEDURE — G0378 HOSPITAL OBSERVATION PER HR: HCPCS

## 2021-04-16 PROCEDURE — 97530 THERAPEUTIC ACTIVITIES: CPT

## 2021-04-16 PROCEDURE — 80048 BASIC METABOLIC PNL TOTAL CA: CPT

## 2021-04-16 PROCEDURE — 97166 OT EVAL MOD COMPLEX 45 MIN: CPT

## 2021-04-16 PROCEDURE — 97110 THERAPEUTIC EXERCISES: CPT

## 2021-04-16 PROCEDURE — 97535 SELF CARE MNGMENT TRAINING: CPT

## 2021-04-16 PROCEDURE — 36415 COLL VENOUS BLD VENIPUNCTURE: CPT

## 2021-04-16 PROCEDURE — 6360000002 HC RX W HCPCS: Performed by: ORTHOPAEDIC SURGERY

## 2021-04-16 PROCEDURE — 6370000000 HC RX 637 (ALT 250 FOR IP): Performed by: ORTHOPAEDIC SURGERY

## 2021-04-16 PROCEDURE — 6370000000 HC RX 637 (ALT 250 FOR IP): Performed by: INTERNAL MEDICINE

## 2021-04-16 RX ORDER — OXYCODONE HYDROCHLORIDE 5 MG/1
5 TABLET ORAL EVERY 4 HOURS PRN
Qty: 42 TABLET | Refills: 0 | Status: SHIPPED | OUTPATIENT
Start: 2021-04-16 | End: 2021-04-23

## 2021-04-16 RX ORDER — CYCLOBENZAPRINE HCL 10 MG
10 TABLET ORAL 3 TIMES DAILY PRN
Status: DISCONTINUED | OUTPATIENT
Start: 2021-04-16 | End: 2021-04-20 | Stop reason: HOSPADM

## 2021-04-16 RX ORDER — CEPHALEXIN 250 MG/1
250 CAPSULE ORAL EVERY 6 HOURS SCHEDULED
Status: DISCONTINUED | OUTPATIENT
Start: 2021-04-16 | End: 2021-04-19

## 2021-04-16 RX ORDER — INSULIN LISPRO 100 [IU]/ML
6 INJECTION, SOLUTION INTRAVENOUS; SUBCUTANEOUS
Status: DISCONTINUED | OUTPATIENT
Start: 2021-04-16 | End: 2021-04-16 | Stop reason: SDUPTHER

## 2021-04-16 RX ORDER — ASPIRIN 81 MG/1
81 TABLET ORAL 2 TIMES DAILY
Qty: 60 TABLET | Refills: 0 | COMMUNITY
Start: 2021-04-16 | End: 2021-05-16

## 2021-04-16 RX ORDER — INSULIN LISPRO 100 [IU]/ML
10 INJECTION, SOLUTION INTRAVENOUS; SUBCUTANEOUS
Status: DISCONTINUED | OUTPATIENT
Start: 2021-04-16 | End: 2021-04-20 | Stop reason: HOSPADM

## 2021-04-16 RX ORDER — CYCLOBENZAPRINE HCL 10 MG
10 TABLET ORAL 3 TIMES DAILY PRN
Qty: 60 TABLET | Refills: 1 | Status: SHIPPED | OUTPATIENT
Start: 2021-04-16

## 2021-04-16 RX ORDER — CEFADROXIL 1000 MG/1
1 TABLET ORAL 2 TIMES DAILY
Qty: 14 TABLET | Refills: 0 | Status: SHIPPED | OUTPATIENT
Start: 2021-04-16 | End: 2021-04-20 | Stop reason: HOSPADM

## 2021-04-16 RX ADMIN — ASPIRIN 81 MG: 81 TABLET, COATED ORAL at 07:52

## 2021-04-16 RX ADMIN — ACETAMINOPHEN 650 MG: 325 TABLET ORAL at 17:14

## 2021-04-16 RX ADMIN — INSULIN LISPRO 10 UNITS: 100 INJECTION, SOLUTION INTRAVENOUS; SUBCUTANEOUS at 17:15

## 2021-04-16 RX ADMIN — GABAPENTIN 300 MG: 300 CAPSULE ORAL at 07:53

## 2021-04-16 RX ADMIN — OXYCODONE 10 MG: 5 TABLET ORAL at 15:13

## 2021-04-16 RX ADMIN — POTASSIUM CHLORIDE 20 MEQ: 1500 TABLET, EXTENDED RELEASE ORAL at 07:53

## 2021-04-16 RX ADMIN — CYCLOBENZAPRINE 10 MG: 10 TABLET, FILM COATED ORAL at 10:50

## 2021-04-16 RX ADMIN — GABAPENTIN 300 MG: 300 CAPSULE ORAL at 21:43

## 2021-04-16 RX ADMIN — CEFAZOLIN 3000 MG: 10 INJECTION, POWDER, FOR SOLUTION INTRAVENOUS at 03:39

## 2021-04-16 RX ADMIN — ROSUVASTATIN CALCIUM 40 MG: 20 TABLET, FILM COATED ORAL at 17:15

## 2021-04-16 RX ADMIN — OXYCODONE 10 MG: 5 TABLET ORAL at 18:11

## 2021-04-16 RX ADMIN — ACETAMINOPHEN 650 MG: 325 TABLET ORAL at 07:09

## 2021-04-16 RX ADMIN — CYCLOBENZAPRINE 10 MG: 10 TABLET, FILM COATED ORAL at 17:15

## 2021-04-16 RX ADMIN — CEPHALEXIN 250 MG: 250 CAPSULE ORAL at 17:15

## 2021-04-16 RX ADMIN — ACETAMINOPHEN 650 MG: 325 TABLET ORAL at 23:29

## 2021-04-16 RX ADMIN — PANTOPRAZOLE SODIUM 40 MG: 40 TABLET, DELAYED RELEASE ORAL at 07:09

## 2021-04-16 RX ADMIN — OXYCODONE 10 MG: 5 TABLET ORAL at 03:36

## 2021-04-16 RX ADMIN — Medication 10 ML: at 07:55

## 2021-04-16 RX ADMIN — INSULIN LISPRO 3 UNITS: 100 INJECTION, SOLUTION INTRAVENOUS; SUBCUTANEOUS at 21:44

## 2021-04-16 RX ADMIN — SERTRALINE HYDROCHLORIDE 100 MG: 100 TABLET ORAL at 07:53

## 2021-04-16 RX ADMIN — INSULIN LISPRO 10 UNITS: 100 INJECTION, SOLUTION INTRAVENOUS; SUBCUTANEOUS at 11:44

## 2021-04-16 RX ADMIN — INSULIN LISPRO 2 UNITS: 100 INJECTION, SOLUTION INTRAVENOUS; SUBCUTANEOUS at 17:15

## 2021-04-16 RX ADMIN — INSULIN LISPRO 2 UNITS: 100 INJECTION, SOLUTION INTRAVENOUS; SUBCUTANEOUS at 07:11

## 2021-04-16 RX ADMIN — OXYCODONE 10 MG: 5 TABLET ORAL at 11:42

## 2021-04-16 RX ADMIN — INSULIN LISPRO 8 UNITS: 100 INJECTION, SOLUTION INTRAVENOUS; SUBCUTANEOUS at 11:44

## 2021-04-16 RX ADMIN — FERROUS SULFATE TAB 325 MG (65 MG ELEMENTAL FE) 325 MG: 325 (65 FE) TAB at 07:53

## 2021-04-16 RX ADMIN — ACETAMINOPHEN 650 MG: 325 TABLET ORAL at 10:50

## 2021-04-16 RX ADMIN — INSULIN GLARGINE 23 UNITS: 100 INJECTION, SOLUTION SUBCUTANEOUS at 21:43

## 2021-04-16 RX ADMIN — OXYCODONE 10 MG: 5 TABLET ORAL at 07:09

## 2021-04-16 RX ADMIN — CEPHALEXIN 250 MG: 250 CAPSULE ORAL at 23:29

## 2021-04-16 RX ADMIN — CEPHALEXIN 250 MG: 250 CAPSULE ORAL at 10:50

## 2021-04-16 RX ADMIN — CYANOCOBALAMIN TAB 1000 MCG 1000 MCG: 1000 TAB at 07:53

## 2021-04-16 RX ADMIN — ASPIRIN 81 MG: 81 TABLET, COATED ORAL at 21:43

## 2021-04-16 RX ADMIN — OXYCODONE 10 MG: 5 TABLET ORAL at 21:43

## 2021-04-16 RX ADMIN — MAGNESIUM OXIDE TAB 400 MG (240 MG ELEMENTAL MG) 400 MG: 400 (240 MG) TAB at 07:52

## 2021-04-16 RX ADMIN — LISINOPRIL 2.5 MG: 5 TABLET ORAL at 07:53

## 2021-04-16 ASSESSMENT — PAIN DESCRIPTION - LOCATION
LOCATION: KNEE

## 2021-04-16 ASSESSMENT — PAIN SCALES - GENERAL
PAINLEVEL_OUTOF10: 7
PAINLEVEL_OUTOF10: 0
PAINLEVEL_OUTOF10: 9
PAINLEVEL_OUTOF10: 8
PAINLEVEL_OUTOF10: 9
PAINLEVEL_OUTOF10: 0

## 2021-04-16 ASSESSMENT — PAIN DESCRIPTION - PAIN TYPE
TYPE: SURGICAL PAIN

## 2021-04-16 ASSESSMENT — PAIN DESCRIPTION - DESCRIPTORS
DESCRIPTORS: ACHING

## 2021-04-16 ASSESSMENT — PAIN DESCRIPTION - ONSET
ONSET: ON-GOING

## 2021-04-16 ASSESSMENT — PAIN DESCRIPTION - PROGRESSION
CLINICAL_PROGRESSION: GRADUALLY WORSENING
CLINICAL_PROGRESSION: GRADUALLY WORSENING
CLINICAL_PROGRESSION: NOT CHANGED

## 2021-04-16 ASSESSMENT — PAIN DESCRIPTION - FREQUENCY
FREQUENCY: CONTINUOUS

## 2021-04-16 ASSESSMENT — PAIN DESCRIPTION - ORIENTATION
ORIENTATION: RIGHT

## 2021-04-16 NOTE — PLAN OF CARE
Problem: Falls - Risk of:  Goal: Will remain free from falls  Description: Will remain free from falls  4/16/2021 1522 by Dina Mauricio RN  Outcome: Ongoing  Note: Patient will remain free from falls throughout shift. Ambulating x1 assist with walker and gait belt. Weak and unsteady on RLE. Fall precautions in place. Non-skid socks on. Bed locked in lowest position with alarm on and 2/4 side rails up. Bedside table, belongings, and call light within reach. Patient calling out appropriately when needing assistance. Hourly rounding in anticipation of patient needs. Floor clean and free from clutter. Room door open. Will continue to monitor. Problem: Skin Integrity:  Goal: Absence of new skin breakdown  Description: Absence of new skin breakdown  Outcome: Ongoing  Note: No skin breakdown noted this shift. Ulcer to L foot that was cleaned and dressed with coban in surgery. Skin inspected frequently. Azeem score a 20. Able to reposition self in bed. Heels and elbows elevated off of bed surface. Continent of bowel and bladder, performing darron care independently. Will continue to monitor. Problem: Pain:  Goal: Pain level will decrease  Description: Pain level will decrease  4/16/2021 1522 by Dina Mauricio RN  Outcome: Ongoing  Note: Endorsing surgical site pain to R knee. Rating pain a 7/10. PRN oxy administered per the [facility], PRN flexeril also available for muscle spasms. Patient educated on medications, side effects, and verbalized understanding. Able to reposition self in bed for comfort. Ice packs to R knee for non-pharmacological pain management. Notified when next dose of medications can be administered. Upon reassessment, patient resting in bed with eyes closed and respirations greater than 14. Will continue to monitor.

## 2021-04-16 NOTE — PROGRESS NOTES
Pt is alert and oriented. VSS. Medicated pt per orders for pain 8/10 on pain scale. Encourage pt to call if pain is not relieved. Neuro checks are WDL. Immobilizer is in place with ice bags. Will continue to monitor.

## 2021-04-16 NOTE — PROGRESS NOTES
Physical Therapy    Facility/Department: [facility]  Initial Assessment/Treatment    NAME: Sammi Kathleen  : 1960  MRN: [id number]    Date of Service: 2021    Discharge Recommendations: Sammi Kathleen scored a 18/24 on the AM-PAC short mobility form. Current research shows that an AM-PAC score of 18 or greater is typically associated with a discharge to the patient's home setting. Based on the patient's AM-PAC score and their current functional mobility deficits, it is recommended that the patient have 2-3 sessions per week of Physical Therapy at d/c to increase the patient's independence. At this time, this patient demonstrates the endurance and safety to discharge home with  OP PT services and a follow up treatment frequency of 2-3x/wk. Please see assessment section for further patient specific details. If patient discharges prior to next session this note will serve as a discharge summary. Please see below for the latest assessment towards goals. PT Equipment Recommendations  Equipment Needed: No    Assessment   Body structures, Functions, Activity limitations: Decreased functional mobility ; Decreased ROM; Decreased strength;Decreased endurance  Assessment: 63 yo seen POD 1 from R TKA. Pt's R knee giving out slightly with ambulation even within knee immobilizer. Gait/transfers very effortful & pt fatigued quickly only walking 12' & 15'. Needs to demo improved mobility prior to dc home & will need to practice stairs. Anticipate possible need to stay until tomorrow- discussed with RN. Will see how pt progresses this afternoon.   Treatment Diagnosis: impaired mobility  Prognosis: Good  Decision Making: Medium Complexity  PT Education: Goals;PT Role;Plan of Care;Gait Training  Patient Education: role of PT, WB status, no pillow under knee, HEP, use of KI, need for wife to provide 24 hr physical A initially- verbalized understanding  REQUIRES PT FOLLOW UP: Yes  Activity of Steps: 4 RILEY- large concrete steps that walker will fit on  Entrance Stairs - Rails: Both  Bathroom Shower/Tub: Tub/Shower unit  Bathroom Toilet: Handicap height  Home Equipment: Rolling walker, Cane, Lift chair  ADL Assistance: Independent  Homemaking Assistance: Needs assistance(he cooks, wife does rest of housework)  Ambulation Assistance: Independent(with cane at all times for past few months)  Transfer Assistance: Independent  Active : Yes  Occupation: On disability  Leisure & Hobbies: play with cats, goes to [employer] 3x/week  Additional Comments: Always sleeps in lift chair. On disability since 2014. s/p fall at doctors office in Jan 2021- has had knee trouble ever since. Objective          AROM RLE (degrees)  RLE General AROM: 15-51  AROM LLE (degrees)  LLE AROM : WFL  Strength RLE  Comment: impaired due to surgery  Strength LLE  Strength LLE: WFL        Bed mobility  Supine to Sit: Stand by assistance(with rail, HOB elev)  Transfers  Sit to Stand: Contact guard assistance(from elev bed height x 1; from chair (effortful))  Stand to sit: Contact guard assistance  Ambulation  Ambulation?: Yes  Ambulation 1  Surface: level tile  Device: Rolling Walker  Other Apparatus: Knee Immobilizer  Assistance: Contact guard assistance  Quality of Gait: slow, effortful gait; initially R knee giving out within knee immobilizer & pt taking very small steps & using UEs strongly on walker  Gait Deviations: Decreased step length;Decreased step height  Distance: 12' (had to pull chair up behind pt to sit due to fatigue); 15'  Stairs/Curb  Stairs?: No     Balance  Sitting - Static: Good(indep sitting EOB)  Exercises  Quad Sets: x 10 R max cues, minimal activation noted  Gluteal Sets: x 10 indep  Hip Abduction: x 10 min A R  Knee Short Arc Quad: x 10 R indep  Knee Active Range of Motion: 15-51  Ankle Pumps: x 10 indep  Comments: Knee ext stretch x 2 min with roll under ankle.    Treatment included gait/transfer training, pt education.     Plan   Plan  Times per week: 7  Times per day: Twice a day  Current Treatment Recommendations: ROM, Strengthening, Balance Training, Functional Mobility Training, Transfer Training, Gait Training, Stair training, Safety Education & Training  Safety Devices  Type of devices: Call light within reach, Chair alarm in place, Nurse notified, Left in chair                                                    AM-PAC Score  AM-PAC Inpatient Mobility Raw Score : 18 (04/16/21 0957)  AM-PAC Inpatient T-Scale Score : 43.63 (04/16/21 0957)  Mobility Inpatient CMS 0-100% Score: 46.58 (04/16/21 0957)  Mobility Inpatient CMS G-Code Modifier : CK (04/16/21 0957)          Goals  Short term goals  Time Frame for Short term goals: dc  Short term goal 1: Transfers SBA  Short term goal 2: Ambulate [de-identified]' with RW SBA  Short term goal 3: up/down 4 curb steps with walker CGA  Short term goal 4: R knee ROM 10-70  Short term goal 5: Demo independence with R TKA ex x 10 reps  Patient Goals   Patient goals : home at dc       Therapy Time   Individual Concurrent Group Co-treatment   Time In 2410         Time Out 5627         Minutes 59           Timed Code Treatment Minutes:   40    Total Treatment Minutes:  [address], [address] [facility]

## 2021-04-16 NOTE — PLAN OF CARE
Problem: Falls - Risk of:  Goal: Will remain free from falls  Outcome: Ongoing   Pt remains free from injury during this shift. Pt is up with assist x 1, knee immobilizer, gait belt and walker. Encourage pt to call for all assistance. Call light is in reach, bed alarm is in reach, bed alarm is activated for safety, bed locked and in lowest position. Will continue to monitor. Problem: Pain:  Goal: Pain level will decrease  Outcome: Ongoing   Medicated pt per orders, please see e-Mar. Encourage pt to call if pain increases. Will continue to monitor.

## 2021-04-16 NOTE — DISCHARGE INSTR - COC
Continuity of Care Form    Patient Name: Anselmo Greenberg   :  1960  MRN:  [id number]    Admit date:  4/15/2021  Discharge date:  2021    Code Status Order: Full Code   Advance Directives:   [facility] Documentation     Date/Time Healthcare Directive Type of Healthcare Directive Copy in [facility] [address] Agent's Name Healthcare Agent's Phone Number    04/15/21 0932  No, patient does not have an advance directive for healthcare treatment -- -- -- -- --    21 1057  No, patient does not have an advance directive for healthcare treatment -- -- -- -- --          Admitting Physician:  Cammie Ochoa MD  PCP: Reva Ross MD    Discharging Nurse: [facility] Unit/Room#: 6728/9929-03  Discharging Unit Phone Number: [phone]    Emergency Contact:   Extended Emergency Contact Information  Primary Emergency Contact: Jakob Lutz  Address: [address] [place] [address] Phone: [phone]  Mobile Phone: [phone]  Relation: Spouse  Secondary Emergency Contact: Anh Jones  Address: [address] Phone: [phone]  Mobile Phone: [phone]  Relation: Brother/Sister    Past Surgical History:  Past Surgical History:   Procedure Laterality Date    ANGIOPLASTY  2015    stent    CHOLECYSTECTOMY      HERNIA REPAIR      three    OTHER SURGICAL HISTORY N/A 10/20/2015    LAPAROSCOPIC SLEEVE GASTRECTOMY           OTHER SURGICAL HISTORY      interstim     bladder and bowel Right Back Hip    MI LAP GASTRIC BYPASS/HARJIT-EN-Y N/A 2018    LAPAROSCOPIC GASTRIC BYPASS WITH POSSIBLE HIATAL HERNIA REPAIR performed by Riki Upton DO at [facility]  2018    LAPAROSCOPIC GASTRIC BYPASS WITH POSSIBLE HIATAL HERNIA    STIMULATOR SURGERY      TESTICLE REMOVAL Right     TOTAL KNEE ARTHROPLASTY Right 4/15/2021    RIGHT TOTAL KNEE ARTHROPLASTY CARLOS performed by Jenniffer Zhang MD at [facility] OR       Immunization History: There is no immunization history on file for this patient. Active Problems:  Patient Active Problem List   Diagnosis Code    Morbid obesity ([facility] [address] E66.01    Primary osteoarthritis of right knee M17.11       Isolation/Infection:   Isolation          No Isolation        Patient Infection Status     None to display          Nurse Assessment:  Last Vital Signs: /83   Pulse 82   Temp 97.3 °F (36.3 °C) (Oral)   Resp 16   Ht 6' (1.829 m)   Wt 295 lb (133.8 kg)   SpO2 93%   BMI 40.01 kg/m²     Last documented pain score (0-10 scale): Pain Level: 8  Last Weight:   Wt Readings from Last 1 Encounters:   04/15/21 295 lb (133.8 kg)     Mental Status:  oriented and alert    IV Access:  - None    Nursing Mobility/ADLs:  Walking   Assisted  Transfer  Assisted  Bathing  Assisted  Dressing  Assisted  Toileting  Assisted  Feeding  Independent  Med Admin  Assisted  Med Delivery   whole    Wound Care Documentation and Therapy:  Wound 04/15/21 Left;Plantar (Active)   Wound Etiology Other 04/16/21 1200   Dressing Status Clean;Dry; Intact 04/16/21 1200   Dressing/Treatment Dry dressing 04/16/21 1200   Wound Assessment Other (Comment) 04/16/21 1200   Drainage Amount None 04/16/21 1200   Odor None 04/16/21 1200   Teresa-wound Assessment Other (Comment) 04/16/21 1200   Margins Other (Comment) 04/16/21 1200   Number of days: 1        Elimination:  Continence:   · Bowel: Yes  · Bladder: Yes  Urinary Catheter: None   Colostomy/Ileostomy/Ileal Conduit: No       Date of Last BM: 4/20/2021    Intake/Output Summary (Last 24 hours) at 4/16/2021 1315  Last data filed at 4/16/2021 1308  Gross per 24 hour   Intake 1529 ml   Output 900 ml   Net 629 ml     I/O last 3 completed shifts: In: 2399 [P.O.:920; I.V.:1479]  Out: 900 [Urine:750; Blood:150]    Safety Concerns:      At Risk for Falls    Impairments/Disabilities:      None    Nutrition Therapy:  Current Nutrition Therapy:   - Oral Diet:  Carb Control 5 carbs/meal (2000kcals/day)    Routes of Feeding: Oral  Liquids: No Restrictions  Daily Fluid Restriction: no  Last Modified Barium Swallow with Video (Video Swallowing Test): not done    Treatments at the Time of Hospital Discharge:   Respiratory Treatments:   Oxygen Therapy:  is not on home oxygen therapy. Ventilator:    - No ventilator support    Rehab Therapies: Physical Therapy and Occupational Therapy  Weight Bearing Status/Restrictions: No weight bearing restirctions  Other Medical Equipment (for information only, NOT a DME order):  walker  Other Treatments:     Patient's personal belongings (please select all that are sent with patient):  Cell phone, , clothing, shoes, wallet    RN SIGNATURE:  Electronically signed by Ky Estrada RN on 4/20/21 at 12:34 PM EDT    CASE MANAGEMENT/SOCIAL WORK SECTION    Inpatient Status Date: 4/15/2021    Readmission Risk Assessment Score:  Readmission Risk              Risk of Unplanned Readmission:        0           Discharge Destination:   [place] [address] [address]   [facility] [address], [facility], [facility] [address]  Report: [phone] ext. [phone]  Fax: [phone]     / signature: Electronically signed by JOLENE Munoz on 4/19/21 at 3:05 PM EDT    PHYSICIAN SECTION    Prognosis: Good    Condition at Discharge: Stable    Rehab Potential (if transferring to Rehab): Good    Recommended Labs or Other Treatments After Discharge:   PT/OT evaluate and treat  Aspirin 81 mg twice daily for 4 weeks  Infection prevention: Doxycycline 100 mg twice a day for 10 days as recommended by infectious disease team.     Physician Certification: I certify the above information and transfer of Kaela Hale  is necessary for the continuing treatment of the diagnosis listed and that he requires [facility] for less 30 days.      Update Admission

## 2021-04-16 NOTE — CARE COORDINATION
Case Management Assessment            Discharge Note                    Date / Time of Note: 4/16/2021 1:14 PM                  Discharge Note Completed by: Juanita Putnam    Patient Name: Son Holbrook   YOB: 1960  Diagnosis: Unilateral primary osteoarthritis, right knee [M17.11]  Primary osteoarthritis of right knee [M17.11]   Date / Time: 4/15/2021  9:09 AM    Current PCP: Carolina Cortez MD  Clinic patient: No    Hospitalization in the last 30 days: No    Advance Directives:  Code Status: Full Code  [place] DNR form completed and on chart: Not Indicated    Financial:  Payor: Riddhi Benitez / Plan: [place] / Product Type: *No Product type* /      Pharmacy:    [address] [place] [address] [phone] [facility] [phone]  [address] [address]  Phone: [phone] Fax: [phone]      Assistance purchasing medications?:    Assistance provided by Case Management: None at this time    Does patient want to participate in local refill/ meds to beds program?:      Meds To Beds General Rules:  1. Can ONLY be done Monday- Friday between 8:30am-5pm  2. Prescription(s) must be in pharmacy by 3pm to be filled same day  3. Copy of patient's insurance/ prescription drug card and patient face sheet must be sent along with the prescription(s)  4. Cost of Rx cannot be added to hospital bill. If financial assistance is needed, please contact unit  or ;  or  CANNOT provide pharmacy voucher for patients co-pays  5.  Patients can then  the prescription on their way out of the hospital at discharge, or pharmacy can deliver to the bedside if staff is available. (payment due at time of pick-up or delivery - cash, check, or card accepted)     Able to afford home medications/ co-pay costs: Yes    ADLS:  Current PT AM-PAC Score: 18 /24  Current OT AM-PAC Score:   /24      DISCHARGE Disposition: Home- No Services Needed    LOC at discharge: Not Applicable  SUHAIL Completed: Yes    Notification completed in HENS/PAS?:  Not Applicable    IMM Completed:   Not Indicated    Transportation:  Transportation PLAN for discharge: family   Mode of Transport: Private Car  Reason for medical transport: Not Applicable  Name of [address] [place] [address],JLUIS [address]: Not Applicable  Time of Transport: when ready    Transport form completed: Not Indicated    Home Care:  [address] ordered at discharge: Not [address]: Not Applicable  Orders faxed: No    Durable Medical Equipment:  DME Provider:   Equipment obtained during hospitalization:     Home Oxygen and Respiratory Equipment:  Oxygen needed at discharge?: Not [address]: Not Applicable  Portable tank available for discharge?: Not Indicated    Dialysis:  Dialysis patient: No    Dialysis Center:  Not Applicable    Hospice Services:  Location: Not Applicable  Agency: Not Applicable    Consents signed: Not Indicated    Referrals made at [facility] for outpatient continued care:  Not Applicable    Additional CM Notes:   Patient discharging to home with wife transporting. Has both a cane and walker in home. Plans for outpatient therapy near home. Patient and wife both denied any questions or concerns regarding discharge plans. The Plan for Transition of Care is related to the following treatment goals of Unilateral primary osteoarthritis, right knee [M17.11]  Primary osteoarthritis of right knee [M17.11]    The Patient and/or patient representative Teresa Butcher and his family were provided with a choice of provider and agrees with the discharge plan Yes    Freedom of choice list was provided with basic dialogue that supports the patient's individualized plan of care/goals and shares the quality data associated with the providers.  Yes    Care Transitions patient: No    Victoria Gonzalez RN  [facility] [employer]  Case Management Department  Ph: [phone]  Fax: [phone]

## 2021-04-16 NOTE — PROGRESS NOTES
Diagnosis: impaired mobility  Prognosis: Good  Decision Making: Medium Complexity  PT Education: Goals;PT Role;Plan of Care;Gait Training  Patient Education: role of PT, WB status, no pillow under knee, HEP, use of KI, need for wife to provide 24 hr physical A initially- pt/verbalized understanding  REQUIRES PT FOLLOW UP: Yes  Activity Tolerance  Activity Tolerance: Patient limited by fatigue;Patient limited by pain  Activity Tolerance: sleepy from muscle relaxer     Patient Diagnosis(es): The encounter diagnosis was Primary osteoarthritis of right knee. has a past medical history of Adopted, Arthritis, Asthma, CAD (coronary artery disease), Depression, Diabetes mellitus ([facility] [address] GERD (gastroesophageal reflux disease), Hyperlipidemia, MI, old, Obesity, Urinary frequency, Urinary urgency, and Vitamin D deficiency. has a past surgical history that includes Cholecystectomy; angioplasty (sept 2015); hernia repair; Testicle removal (Right); other surgical history (N/A, 10/20/2015); other surgical history; Andi-en-Y Gastric Bypass (11/19/2018); pr lap gastric bypass/andi-en-y (N/A, 11/19/2018); Stimulator Surgery; and Total knee arthroplasty (Right, 4/15/2021). Restrictions  Position Activity Restriction  Other position/activity restrictions: Full weight bearing on operative leg; Knee immobilizer until full quadriceps function present  Subjective   General  Chart Reviewed: Yes  Additional Pertinent Hx: s/p right total knee replacement using robotic assistance (CARLOS Robot) on 4/15  Family / Caregiver Present: Yes(wife)  Referring Practitioner: Jenniffer Zhang MD  Subjective  Subjective: Found in bed, agreeable to PT. Reports a lot of pain & sleepy from muscle relaxer.   Pain Screening  Patient Currently in Pain: Yes(9/10, RN aware & gave meds)  Vital Signs  Patient Currently in Pain: Yes(9/10, RN aware & gave meds)       Orientation  Orientation  Overall Orientation Status: Within Normal Limits  Cognition Objective   Bed mobility  Supine to Sit: Stand by assistance(with railing)  Sit to Supine: Moderate assistance(to lift RLE into bed)  Comment: Pt will be sleeping in lift chair at home. Transfers  Sit to Stand: Contact guard assistance(bed raised up (will use lift chair at home majority of time)); effortful to stand  Stand to sit: Contact guard assistance  Ambulation  Ambulation?: Yes  Ambulation 1  Surface: level tile  Device: Rolling Walker  Other Apparatus: Knee Immobilizer  Assistance: Contact guard assistance  Quality of Gait: slow, effortful gait  Gait Deviations: Decreased step length;Decreased step height  Distance: 30'  Stairs/Curb  Stairs?: No(too fatigued after walking to attempt today & somewhat drowsy at times). Eyes closing at times when walking in room & had to instruct pt to open. Balance  Sitting - Static: Good(indep sitting EOB)  Exercises  Straight Leg Raise: x 10 mod A  Quad Sets: x 10 R max cues, minimal activation noted  Heelslides: x 10 mod A  Gluteal Sets: x 10 indep  Hip Abduction: x 10 min A R  Knee Short Arc Quad: x 10 R min A  Knee Active Range of Motion: 15-40  Ankle Pumps: x 10 indep                                                                            AM-PAC Score  AM-PAC Inpatient Mobility Raw Score : 18 (04/16/21 1543)  AM-PAC Inpatient T-Scale Score : 43.63 (04/16/21 1543)  Mobility Inpatient CMS 0-100% Score: 46.58 (04/16/21 1543)  Mobility Inpatient CMS G-Code Modifier : CK (04/16/21 1543)          Goals- ongoing 4/16  Short term goals  Time Frame for Short term goals: dc  Short term goal 1: Transfers SBA  Short term goal 2: Ambulate [de-identified]' with RW SBA  Short term goal 3: up/down 4 curb steps with walker CGA- revised:  Up/down 4 steps with B rails or rail/AD CGA.   Short term goal 4: R knee ROM 10-70  Short term goal 5: Demo independence with R TKA ex x 10 reps  Patient Goals   Patient goals : home at [address] per week: 7  Times per day: Twice a day  Current Treatment Recommendations: ROM, Strengthening, Balance Training, Functional Mobility Training, Transfer Training, Gait Training, Stair training, Safety Education & Training  Safety Devices  Type of devices: Call light within reach, Bed alarm in place, Nurse notified, Left in bed     Therapy Time   Individual Concurrent Group Co-treatment   Time In 1500         Time Out 1530         Minutes 30           Timed Code Treatment Minutes:   30    Total Treatment Minutes:  [address], [address]

## 2021-04-16 NOTE — PROGRESS NOTES
Patient electing to stay another night and possibly go home tomorrow. Was not able to practice steps with physical therapy. Per therapy, patient not ready to be discharged today from their perspective.

## 2021-04-16 NOTE — CARE COORDINATION
CM spoke to patient and wife at bedside. Patient here from home. Plans to return to home with wife transporting. Has both a cane and walker in home. Plans for outpatient therapy near home when discharged. Does not anticipate any needs from CM. Discharge order noted. However, patient stated he spoke with someone who thought he may be able to stay in patient 1 more day. CM will continue to follow for any changes to discharge plans.   Harry Bravo RN  RN Case Manager  [phone]

## 2021-04-16 NOTE — PROGRESS NOTES
[facility] Progress Note        Subjective:  Pt is resting in bed with minimal complaints of discomfort. Blood pressure 101/61, pulse 79, temperature 97.6 °F (36.4 °C), temperature source Oral, resp. rate 18, height 6' (1.829 m), weight 295 lb (133.8 kg), SpO2 93 %.     PHYSICAL EXAM:   R lower extremity  Dressing clean and dry  +EHL/FHL/ADF  Sensation intact to light touch distally  Skin warm and well perfused    HgB:    Lab Results   Component Value Date    HGB 10.7 04/16/2021       ASSESSMENT AND PLAN:    64 y.o. male status post R TKA    1:  Weight bearing as tolerated   2:  Deep venous thrombosis prophylaxis: ASA BID  3:  Continue mobilization, physical therapy  4:  D/C Plan:  Home today if cleared with PT      Michel Morales MD

## 2021-04-16 NOTE — PROGRESS NOTES
light, conjunctiva/corneas clear  Ears/Nose/Mouth/Throat: No external lesions or scars, hearing intact to voice  Neck: Trachea midline, no masses noted, no thyromegaly  Respiratory:  Non-labored breathing, clear to auscultation bilaterally  Cardiovascular: Regular rate and rhythm, no murmurs, gallops, or rubs  Abdomen: soft, non-tender, non-distended  Musculoskeletal: Warm, well perfused, no cyanosis or edema  Skin: normal color, no wounds noted  Psychiatric: A&Ox4, good insight and judgment    Labs:   Recent Labs     04/16/21  0504   WBC 14.8*   HGB 10.7*   HCT 32.8*        Recent Labs     04/16/21  0504      K 4.7      CO2 23   BUN 21*   CREATININE 1.2   CALCIUM 8.7     Recent Labs     04/15/21  1010   AST 41*   ALT 27   BILIDIR <0.2   BILITOT 0.4   ALKPHOS 77     Recent Labs     04/15/21  1010   INR 1.07     No results for input(s): CKTOTAL, TROPONINI in the last 72 hours. Urinalysis:    No results found for: Cecille Bal, BACTERIA, RBCUA, BLOODU, SPECGRAV, [address] [place] [address]    Radiology:  XR KNEE RIGHT (1-2 VIEWS)   Final Result   Impression:    Status post total knee arthroplasty. Assessment/Plan:    Active Hospital Problems    Diagnosis Date Noted    Primary osteoarthritis of right knee [M17.11] 04/15/2021       Plan:    # right [facility] all three components were cemented ; posterior cruciate retaining implants.  Medial parapatellar arthrotomy.  -management as per primary     # T2DM  -carb control diet  -hold metformin  -lantus w/ mealtime and correctional insulin  -continue gabapentin  -plan to dc on home regimen     # Asthma  -continue home inhalers  -breathing treatments PRN     # HLD  -continue statin     # Depression  -continue sertraline     # CAD  -continue asa     # Morbid obesity  -Complicating assessment and treatment.  Placing patient at risk for multiple co-morbidities as well as early death and contributing to the patient's presentation. Keya Gomez on weight loss.        # Other chronic conditions  -continue home management    DVT Prophylaxis: Per primary  Diet: DIET CARB CONTROL;  Code Status: Full Code    PT/OT Eval Status: per primary    Dispo: per primary    Alise Contreras MD

## 2021-04-17 LAB
GLUCOSE BLD-MCNC: 140 MG/DL (ref 70–99)
GLUCOSE BLD-MCNC: 178 MG/DL (ref 70–99)
GLUCOSE BLD-MCNC: 215 MG/DL (ref 70–99)
GLUCOSE BLD-MCNC: 234 MG/DL (ref 70–99)
PERFORMED ON: ABNORMAL

## 2021-04-17 PROCEDURE — G0378 HOSPITAL OBSERVATION PER HR: HCPCS

## 2021-04-17 PROCEDURE — 6370000000 HC RX 637 (ALT 250 FOR IP): Performed by: ORTHOPAEDIC SURGERY

## 2021-04-17 PROCEDURE — 97530 THERAPEUTIC ACTIVITIES: CPT

## 2021-04-17 PROCEDURE — 97535 SELF CARE MNGMENT TRAINING: CPT

## 2021-04-17 PROCEDURE — 97110 THERAPEUTIC EXERCISES: CPT

## 2021-04-17 PROCEDURE — 2580000003 HC RX 258: Performed by: ORTHOPAEDIC SURGERY

## 2021-04-17 PROCEDURE — 97116 GAIT TRAINING THERAPY: CPT

## 2021-04-17 RX ORDER — GABAPENTIN 300 MG/1
300 CAPSULE ORAL 3 TIMES DAILY
Status: DISCONTINUED | OUTPATIENT
Start: 2021-04-17 | End: 2021-04-20 | Stop reason: HOSPADM

## 2021-04-17 RX ORDER — CELECOXIB 100 MG/1
100 CAPSULE ORAL 2 TIMES DAILY WITH MEALS
Status: DISCONTINUED | OUTPATIENT
Start: 2021-04-17 | End: 2021-04-20 | Stop reason: HOSPADM

## 2021-04-17 RX ADMIN — CEPHALEXIN 250 MG: 250 CAPSULE ORAL at 13:17

## 2021-04-17 RX ADMIN — ASPIRIN 81 MG: 81 TABLET, COATED ORAL at 20:27

## 2021-04-17 RX ADMIN — PANTOPRAZOLE SODIUM 40 MG: 40 TABLET, DELAYED RELEASE ORAL at 07:23

## 2021-04-17 RX ADMIN — LISINOPRIL 2.5 MG: 5 TABLET ORAL at 10:06

## 2021-04-17 RX ADMIN — ACETAMINOPHEN 650 MG: 325 TABLET ORAL at 18:48

## 2021-04-17 RX ADMIN — CELECOXIB 100 MG: 100 CAPSULE ORAL at 17:08

## 2021-04-17 RX ADMIN — CEPHALEXIN 250 MG: 250 CAPSULE ORAL at 07:23

## 2021-04-17 RX ADMIN — ACETAMINOPHEN 650 MG: 325 TABLET ORAL at 13:17

## 2021-04-17 RX ADMIN — INSULIN LISPRO 10 UNITS: 100 INJECTION, SOLUTION INTRAVENOUS; SUBCUTANEOUS at 17:12

## 2021-04-17 RX ADMIN — INSULIN LISPRO 2 UNITS: 100 INJECTION, SOLUTION INTRAVENOUS; SUBCUTANEOUS at 07:43

## 2021-04-17 RX ADMIN — POTASSIUM CHLORIDE 20 MEQ: 1500 TABLET, EXTENDED RELEASE ORAL at 10:06

## 2021-04-17 RX ADMIN — INSULIN LISPRO 10 UNITS: 100 INJECTION, SOLUTION INTRAVENOUS; SUBCUTANEOUS at 07:45

## 2021-04-17 RX ADMIN — INSULIN LISPRO 4 UNITS: 100 INJECTION, SOLUTION INTRAVENOUS; SUBCUTANEOUS at 17:10

## 2021-04-17 RX ADMIN — INSULIN GLARGINE 23 UNITS: 100 INJECTION, SOLUTION SUBCUTANEOUS at 20:27

## 2021-04-17 RX ADMIN — CYANOCOBALAMIN TAB 1000 MCG 1000 MCG: 1000 TAB at 10:07

## 2021-04-17 RX ADMIN — FERROUS SULFATE TAB 325 MG (65 MG ELEMENTAL FE) 325 MG: 325 (65 FE) TAB at 10:07

## 2021-04-17 RX ADMIN — OXYCODONE 10 MG: 5 TABLET ORAL at 03:30

## 2021-04-17 RX ADMIN — DOCUSATE SODIUM 50 MG AND SENNOSIDES 8.6 MG 1 TABLET: 8.6; 5 TABLET, FILM COATED ORAL at 20:27

## 2021-04-17 RX ADMIN — GABAPENTIN 300 MG: 300 CAPSULE ORAL at 15:14

## 2021-04-17 RX ADMIN — ASPIRIN 81 MG: 81 TABLET, COATED ORAL at 10:06

## 2021-04-17 RX ADMIN — ACETAMINOPHEN 650 MG: 325 TABLET ORAL at 07:23

## 2021-04-17 RX ADMIN — ROSUVASTATIN CALCIUM 40 MG: 20 TABLET, FILM COATED ORAL at 18:48

## 2021-04-17 RX ADMIN — CEPHALEXIN 250 MG: 250 CAPSULE ORAL at 18:48

## 2021-04-17 RX ADMIN — MAGNESIUM OXIDE TAB 400 MG (240 MG ELEMENTAL MG) 400 MG: 400 (240 MG) TAB at 10:06

## 2021-04-17 RX ADMIN — OXYCODONE 10 MG: 5 TABLET ORAL at 07:23

## 2021-04-17 RX ADMIN — CYCLOBENZAPRINE 10 MG: 10 TABLET, FILM COATED ORAL at 10:06

## 2021-04-17 RX ADMIN — SERTRALINE HYDROCHLORIDE 100 MG: 100 TABLET ORAL at 10:06

## 2021-04-17 RX ADMIN — OXYCODONE 10 MG: 5 TABLET ORAL at 13:17

## 2021-04-17 RX ADMIN — OXYCODONE 10 MG: 5 TABLET ORAL at 18:48

## 2021-04-17 RX ADMIN — GABAPENTIN 300 MG: 300 CAPSULE ORAL at 10:07

## 2021-04-17 RX ADMIN — Medication 10 ML: at 20:30

## 2021-04-17 RX ADMIN — Medication 10 ML: at 10:07

## 2021-04-17 RX ADMIN — INSULIN LISPRO 2 UNITS: 100 INJECTION, SOLUTION INTRAVENOUS; SUBCUTANEOUS at 20:28

## 2021-04-17 RX ADMIN — GABAPENTIN 300 MG: 300 CAPSULE ORAL at 20:27

## 2021-04-17 ASSESSMENT — PAIN - FUNCTIONAL ASSESSMENT
PAIN_FUNCTIONAL_ASSESSMENT: PREVENTS OR INTERFERES SOME ACTIVE ACTIVITIES AND ADLS
PAIN_FUNCTIONAL_ASSESSMENT: PREVENTS OR INTERFERES SOME ACTIVE ACTIVITIES AND ADLS

## 2021-04-17 ASSESSMENT — PAIN DESCRIPTION - LOCATION
LOCATION: KNEE
LOCATION: KNEE

## 2021-04-17 ASSESSMENT — PAIN DESCRIPTION - PROGRESSION
CLINICAL_PROGRESSION: NOT CHANGED

## 2021-04-17 ASSESSMENT — PAIN DESCRIPTION - ORIENTATION
ORIENTATION: RIGHT

## 2021-04-17 ASSESSMENT — PAIN DESCRIPTION - FREQUENCY
FREQUENCY: CONTINUOUS

## 2021-04-17 ASSESSMENT — PAIN DESCRIPTION - ONSET
ONSET: ON-GOING

## 2021-04-17 ASSESSMENT — PAIN SCALES - GENERAL
PAINLEVEL_OUTOF10: 9
PAINLEVEL_OUTOF10: 7
PAINLEVEL_OUTOF10: 9
PAINLEVEL_OUTOF10: 0
PAINLEVEL_OUTOF10: 8
PAINLEVEL_OUTOF10: 4
PAINLEVEL_OUTOF10: 10
PAINLEVEL_OUTOF10: 6

## 2021-04-17 ASSESSMENT — PAIN DESCRIPTION - PAIN TYPE
TYPE: SURGICAL PAIN
TYPE: SURGICAL PAIN

## 2021-04-17 ASSESSMENT — PAIN DESCRIPTION - DESCRIPTORS: DESCRIPTORS: ACHING

## 2021-04-17 NOTE — PROGRESS NOTES
Pupils equal, round, reactive to light, conjunctiva/corneas clear  Ears/Nose/Mouth/Throat: No external lesions or scars, hearing intact to voice  Neck: Trachea midline, no masses noted, no thyromegaly  Respiratory:  Non-labored breathing, clear to auscultation bilaterally  Cardiovascular: Regular rate and rhythm, no murmurs, gallops, or rubs  Abdomen: soft, non-tender, non-distended  Musculoskeletal: Warm, well perfused, no cyanosis or edema  Skin: normal color, no wounds noted  Psychiatric: A&Ox4, good insight and judgment    Labs:   Recent Labs     04/16/21  0504   WBC 14.8*   HGB 10.7*   HCT 32.8*        Recent Labs     04/16/21  0504      K 4.7      CO2 23   BUN 21*   CREATININE 1.2   CALCIUM 8.7     Recent Labs     04/15/21  1010   AST 41*   ALT 27   BILIDIR <0.2   BILITOT 0.4   ALKPHOS 77     Recent Labs     04/15/21  1010   INR 1.07     No results for input(s): CKTOTAL, TROPONINI in the last 72 hours. Urinalysis:    No results found for: Sivaene Rivas, BACTERIA, RBCUA, BLOODU, SPECGRAV, [address] [place] [address]    Radiology:  XR KNEE RIGHT (1-2 VIEWS)   Final Result   Impression:    Status post total knee arthroplasty. Assessment/Plan:    Active Hospital Problems    Diagnosis Date Noted    Primary osteoarthritis of right knee [M17.11] 04/15/2021       Plan:    # right total knee replacement using robotic assistance [facility]); all three components were cemented ; posterior cruciate retaining implants.  Medial parapatellar arthrotomy.  -management as per primary     # T2DM  -carb control diet  -hold metformin  -lantus w/ mealtime and correctional insulin  -continue gabapentin  -plan to dc on home regimen     # Asthma  -continue home inhalers  -breathing treatments PRN     # HLD  -continue statin     # Depression  -continue sertraline     # CAD  -continue asa     # Morbid obesity  -Complicating assessment and treatment.  Placing patient at risk for multiple co-morbidities as well as early death and contributing to the patient's presentation. Ina Molina on weight loss.        # Other chronic conditions  -continue home management    DVT Prophylaxis: Per primary  Diet: DIET CARB CONTROL;  Code Status: Full Code    PT/OT Eval Status: per primary    Dispo: per primary    Brooke Duron MD

## 2021-04-17 NOTE — PROGRESS NOTES
Pt is alert and oriented. VSS with the exception of low O2 sats. Pt is encouraged to use the incentive spirometer as well as cough and deep breathing. Neuro checks are WDL. Medicated pt per orders. All needs are within reach. Safety precautions are in place. Will continue to monitor.

## 2021-04-17 NOTE — PROGRESS NOTES
Physical Therapy  Facility/Department: [facility] 5T ORTHO/NEURO  Daily Treatment Note  NAME: Guido Cassidy  : 1960  MRN: [id number]    Date of Service: 2021    Discharge Recommendations:  Guido Cassidy scored a 17/24 on the AM-PAC short mobility form. Current research shows that an AM-PAC score of 17 or less is typically not associated with a discharge to the patient's home setting. Based on the patient's AM-PAC score and their current functional mobility deficits, it is recommended that the patient have 3-5 sessions per week of Physical Therapy at d/c to increase the patient's independence. Please see assessment section for further patient specific details. If patient discharges prior to next session this note will serve as a discharge summary. Please see below for the latest assessment towards goals. PT Equipment Recommendations  Equipment Needed: No    Assessment   Body structures, Functions, Activity limitations: Decreased functional mobility ; Decreased ROM; Decreased strength;Decreased endurance  Assessment: Pt with limited and effortful mobility. Pt moving slowly and with limited knee ROM. Pt may benefit from continued inpt PT at d/c prior to return home as pt moving very slowly. Will continue. Treatment Diagnosis: impaired mobility  Prognosis: Good  Decision Making: Medium Complexity  PT Education: Goals;PT Role;Plan of Care;Gait Training  Patient Education: Pt verbalized understanding  REQUIRES PT FOLLOW UP: Yes  Activity Tolerance  Activity Tolerance: Patient limited by fatigue;Patient limited by pain     Patient Diagnosis(es): The encounter diagnosis was Primary osteoarthritis of right knee. has a past medical history of Adopted, Arthritis, Asthma, CAD (coronary artery disease), Depression, Diabetes mellitus ([address] GERD (gastroesophageal reflux disease), Hyperlipidemia, MI, old, Obesity, Urinary frequency, Urinary urgency, and Vitamin D deficiency.    has a past surgical history that includes Cholecystectomy; angioplasty (sept 2015); hernia repair; Testicle removal (Right); other surgical history (N/A, 10/20/2015); other surgical history; Andi-en-Y Gastric Bypass (11/19/2018); pr lap gastric bypass/andi-en-y (N/A, 11/19/2018); Stimulator Surgery; and Total knee arthroplasty (Right, 4/15/2021). Restrictions  Position Activity Restriction  Other position/activity restrictions: Full weight bearing on operative leg; Knee immobilizer until full quadriceps function present     Subjective   General  Chart Reviewed: Yes  Additional Pertinent Hx: s/p right total knee replacement using robotic assistance ([employer] Robot) on 4/15  Family / Caregiver Present: No  Referring Practitioner: Jerzy Hernandez MD  Subjective  Subjective: Pt sitting up in recliner and agreeable to PT. Pt states he was having trouble with mobility earlier. Pain Screening  Patient Currently in Pain: Yes  Pain Assessment  Pain Level: 9, nursing notified       Orientation  Orientation  Overall Orientation Status: Within Normal Limits    Objective      Transfers  Sit to Stand: Minimal Assistance  Stand to sit: Minimal Assistance(Pt plopped into chair)     Ambulation 1  Device: Rolling Walker  Other Apparatus: Knee Immobilizer  Assistance: Contact guard assistance  Quality of Gait: Slow, effortful gait, flexed posture  Gait Deviations: Decreased step length;Decreased step height  Distance: 30 feet     Exercises  Quad Sets: x 10 R max cues, minimal activation noted  Gluteal Sets: x 10 indep  Hip Abduction: x 10 min A R  Knee Short Arc Quad: x 10 R min A  Knee Active Range of Motion: 15-50  Ankle Pumps: x 10 indep      Goals  Short term goals  Time Frame for Short term goals: dc  Short term goal 1: Transfers SBA  Short term goal 2: Ambulate [de-identified]' with RW SBA  Short term goal 3: up/down 4 curb steps with walker CGA- revised 4/16:  up/down 4 steps with B rails or 1 rail/AD CGA.   Short term goal 4: R knee ROM 10-70  Short term goal 5: Demo independence with R TKA ex x 10 reps  Patient Goals   Patient goals : home at [address] [place] [address]  Times per week: 7  Times per day: Twice a day  Current Treatment Recommendations: ROM, Strengthening, Balance Training, Functional Mobility Training, Transfer Training, Gait Training, Stair training, Safety Education & Training  Safety Devices  Type of devices: Call light within reach, Bed alarm in place, Nurse notified, Left in bed     Therapy Time   Individual Concurrent Group Co-treatment   Time In 0850         Time Out 0917         Minutes 27           Timed Code Treatment Minutes:   27    Total Treatment Minutes:  [address], PT

## 2021-04-17 NOTE — PLAN OF CARE
Problem: Falls - Risk of:  Goal: Will remain free from falls  Outcome: Ongoing   Pt remains free from injury during this shift. Pt is up with assistance x1 person. Encourage pt to call for all assistance. Call light is in reach, bed alarm is activated for safety, bed locked and in lowest position. Will continue to monitor. Problem: Pain:  Goal: Pain level will decrease  Outcome: Ongoing   Medicated pt per orders, please see e-Mar. Encourage pt to call if is unrelieved. Will continue to monitor.

## 2021-04-17 NOTE — PROGRESS NOTES
Occupational Therapy  Facility/Department: [facility] 5T ORTHO/NEURO  Daily Treatment Note  NAME: Yong Lacey  : 1960  MRN: [id number]    Date of Service: 2021    Discharge Recommendations:  Yong Lacey scored a 16/24 on the AM-PAC ADL Inpatient form. Current research shows that an AM-PAC score of 17 or less is typically not associated with a discharge to the patient's home setting. Based on the patient's AM-PAC score and their current ADL deficits, it is recommended that the patient have 3-5 sessions per week of Occupational Therapy at d/c to increase the patient's independence. Please see assessment section for further patient specific details. If patient discharges prior to next session this note will serve as a discharge summary. Please see below for the latest assessment towards goals. OT Equipment Recommendations  Equipment Needed: Yes  Other: Tub transfer bench / Raised Toilet seat w / arms---pt edu on use. Assessment   Performance deficits / Impairments: Decreased functional mobility ; Decreased ADL status; Decreased endurance;Decreased balance  Assessment: Pt req frequent cues + mod assist for mobility and safe walker use. Pt also req increased assist for ADL. Feel pt is a fall risk and safety is a concern. Feel pt would benefit from further IP OT services prior to going home. Treatment Diagnosis: impaired ADLs / functional transfers / decreased endurance  Prognosis: Fair;Good  OT Education: OT Role;Plan of Care;ADL Adaptive Strategies;Transfer Training;Precautions  Patient Education: Needs practice  REQUIRES OT FOLLOW UP: Yes  Activity Tolerance  Activity Tolerance: Patient limited by pain; Patient limited by fatigue  Safety Devices  Safety Devices in place: Yes  Type of devices: Left in chair;Call light within reach; Chair alarm in place;Nurse notified       Restrictions  Position Activity Restriction  Other position/activity restrictions: Full weight bearing on operative leg; Knee immobilizer until full quadriceps function present  Subjective   General  Chart Reviewed: Yes  Additional Pertinent Hx: Admit to observation 4/15 s/p R TOTAL KNEE ARTHROPLASTY GIOVANY                           PMHX: CAD, DM, HLD, Depression,Obesity, Gasric bypass 2018,  Family / Caregiver Present: No  Diagnosis: R TKA -Giovany on 4/15  Subjective  Subjective: Pt in bed upon entry. I 'm going for the bed! Vital Signs  Patient Currently in Pain: Yes(9/10, nurse aware)   Orientation  Orientation  Overall Orientation Status: Within Functional Limits  Objective    ADL  Grooming: Contact guard assistance(to brush teeth, standing at sink. Pt leaned heavily on sink.)  LE Dressing: (Dependent to don knee immobilizer)  Toileting: (denied need)        Balance  Sitting Balance: Supervision  Standing Balance: Contact guard assistance(static)  Standing Balance  Time: ~2 min  Activity: bathroom mobility/activity  Functional Mobility  Functional - Mobility Device: Rolling Walker  Activity: To/from bathroom  Assist Level: Minimal assistance(+cues)  Functional Mobility Comments: heavy lean on walker -- fatigues easily with mobility. Pt barely made it from bathroom to bed, and went to sit on bed prior to being all the way there. Toilet Transfers  Toilet - Technique: Ambulating  Equipment Used: Standard bedside commode(over toilet)  Toilet Transfer: Moderate assistance(+cues)  Bed mobility  Supine to Sit: Minimal assistance(HOB elevated, use of rail and very effort full)  Scooting: Stand by assistance  Transfers  Stand Step Transfers: Moderate assistance(+cues)  Sit to stand:  Moderate assistance(from raised bed and 3 in 1 commode, +cues)  Stand to sit: Minimal assistance(+cues)  Transfer Comments: Pt req frequent cues for safe walker use                       Cognition  Overall Cognitive Status: Exceptions  Safety Judgement: Decreased awareness of need for safety  Insights: Decreased awareness of deficits Plan   Plan  Times per week: 7x  Times per day: Daily  Current Treatment Recommendations: Functional Mobility Training, Endurance Training, Safety Education & Training, Self-Care / ADL, Equipment Evaluation, Education, & procurement, Patient/Caregiver Education & Training  AM-PAC Score        AM-PAC Inpatient Daily Activity Raw Score: 16 (04/17/21 0848)  AM-PAC Inpatient ADL T-Scale Score : 35.96 (04/17/21 0848)  ADL Inpatient CMS 0-100% Score: 53.32 (04/17/21 0848)  ADL Inpatient CMS G-Code Modifier : CK (04/17/21 0848)    Goals                            No goals met  Short term goals  Time Frame for Short term goals: at d/c  Short term goal 1: Stance x 6 mins with Supervision for ADLs  Short term goal 2: LE Dressing with CGA and AE prn  Short term goal 3: Commode transers with SBA /RTS prn  Patient Goals   Patient goals : Be independent       Therapy Time   Individual Concurrent Group Co-treatment   Time In 0800         Time Out 0844         Minutes 44         Timed Code Treatment Minutes: [address] [facility], OTR/L [address]

## 2021-04-17 NOTE — PROGRESS NOTES
[facility] Progress Note        Subjective:  Pt sitting up in bed, appears comfortable. States pain has been challenging. Will add celebrex and increase neurontin    Blood pressure (!) 148/81, pulse 91, temperature 97.7 °F (36.5 °C), temperature source Oral, resp. rate 18, height 6' (1.829 m), weight 295 lb (133.8 kg), SpO2 (!) 88 %.     PHYSICAL EXAM:   R lower extremity  Dressing clean and dry  +EHL/FHL/ADF  Sensation intact to light touch distally  Skin warm and well perfused    HgB:    Lab Results   Component Value Date    HGB 10.7 04/16/2021       ASSESSMENT AND PLAN:    64 y.o. male status post R TKA    1:  Weight bearing as tolerated   2:  Deep venous thrombosis prophylaxis: ASA BID  3:  Continue mobilization, physical therapy  4:  D/C Plan:  Pending PT/OT clearance      Santos Hankins MD

## 2021-04-18 LAB
GLUCOSE BLD-MCNC: 154 MG/DL (ref 70–99)
GLUCOSE BLD-MCNC: 165 MG/DL (ref 70–99)
GLUCOSE BLD-MCNC: 205 MG/DL (ref 70–99)
GLUCOSE BLD-MCNC: 238 MG/DL (ref 70–99)
PERFORMED ON: ABNORMAL

## 2021-04-18 PROCEDURE — 97530 THERAPEUTIC ACTIVITIES: CPT

## 2021-04-18 PROCEDURE — 6370000000 HC RX 637 (ALT 250 FOR IP): Performed by: ORTHOPAEDIC SURGERY

## 2021-04-18 PROCEDURE — G0378 HOSPITAL OBSERVATION PER HR: HCPCS

## 2021-04-18 PROCEDURE — 97116 GAIT TRAINING THERAPY: CPT

## 2021-04-18 PROCEDURE — 2580000003 HC RX 258: Performed by: ORTHOPAEDIC SURGERY

## 2021-04-18 PROCEDURE — 97110 THERAPEUTIC EXERCISES: CPT

## 2021-04-18 RX ADMIN — OXYCODONE 10 MG: 5 TABLET ORAL at 09:50

## 2021-04-18 RX ADMIN — INSULIN LISPRO 10 UNITS: 100 INJECTION, SOLUTION INTRAVENOUS; SUBCUTANEOUS at 17:04

## 2021-04-18 RX ADMIN — PANTOPRAZOLE SODIUM 40 MG: 40 TABLET, DELAYED RELEASE ORAL at 05:39

## 2021-04-18 RX ADMIN — INSULIN LISPRO 2 UNITS: 100 INJECTION, SOLUTION INTRAVENOUS; SUBCUTANEOUS at 20:37

## 2021-04-18 RX ADMIN — ACETAMINOPHEN 650 MG: 325 TABLET ORAL at 00:16

## 2021-04-18 RX ADMIN — ACETAMINOPHEN 650 MG: 325 TABLET ORAL at 12:08

## 2021-04-18 RX ADMIN — INSULIN LISPRO 10 UNITS: 100 INJECTION, SOLUTION INTRAVENOUS; SUBCUTANEOUS at 07:56

## 2021-04-18 RX ADMIN — ACETAMINOPHEN 650 MG: 325 TABLET ORAL at 05:39

## 2021-04-18 RX ADMIN — GABAPENTIN 300 MG: 300 CAPSULE ORAL at 20:38

## 2021-04-18 RX ADMIN — INSULIN LISPRO 2 UNITS: 100 INJECTION, SOLUTION INTRAVENOUS; SUBCUTANEOUS at 07:54

## 2021-04-18 RX ADMIN — ACETAMINOPHEN 650 MG: 325 TABLET ORAL at 19:00

## 2021-04-18 RX ADMIN — MAGNESIUM OXIDE TAB 400 MG (240 MG ELEMENTAL MG) 400 MG: 400 (240 MG) TAB at 09:06

## 2021-04-18 RX ADMIN — POTASSIUM CHLORIDE 20 MEQ: 1500 TABLET, EXTENDED RELEASE ORAL at 09:07

## 2021-04-18 RX ADMIN — CYCLOBENZAPRINE 10 MG: 10 TABLET, FILM COATED ORAL at 07:53

## 2021-04-18 RX ADMIN — GABAPENTIN 300 MG: 300 CAPSULE ORAL at 15:09

## 2021-04-18 RX ADMIN — OXYCODONE 10 MG: 5 TABLET ORAL at 15:09

## 2021-04-18 RX ADMIN — Medication 10 ML: at 21:00

## 2021-04-18 RX ADMIN — CEPHALEXIN 250 MG: 250 CAPSULE ORAL at 05:39

## 2021-04-18 RX ADMIN — CEPHALEXIN 250 MG: 250 CAPSULE ORAL at 19:00

## 2021-04-18 RX ADMIN — SERTRALINE HYDROCHLORIDE 100 MG: 100 TABLET ORAL at 20:38

## 2021-04-18 RX ADMIN — FERROUS SULFATE TAB 325 MG (65 MG ELEMENTAL FE) 325 MG: 325 (65 FE) TAB at 09:06

## 2021-04-18 RX ADMIN — CEPHALEXIN 250 MG: 250 CAPSULE ORAL at 12:09

## 2021-04-18 RX ADMIN — CYANOCOBALAMIN TAB 1000 MCG 1000 MCG: 1000 TAB at 09:08

## 2021-04-18 RX ADMIN — OXYCODONE 10 MG: 5 TABLET ORAL at 05:39

## 2021-04-18 RX ADMIN — Medication 10 ML: at 09:07

## 2021-04-18 RX ADMIN — INSULIN LISPRO 4 UNITS: 100 INJECTION, SOLUTION INTRAVENOUS; SUBCUTANEOUS at 12:09

## 2021-04-18 RX ADMIN — CELECOXIB 100 MG: 100 CAPSULE ORAL at 19:00

## 2021-04-18 RX ADMIN — GABAPENTIN 300 MG: 300 CAPSULE ORAL at 09:06

## 2021-04-18 RX ADMIN — INSULIN LISPRO 2 UNITS: 100 INJECTION, SOLUTION INTRAVENOUS; SUBCUTANEOUS at 17:03

## 2021-04-18 RX ADMIN — OXYCODONE 10 MG: 5 TABLET ORAL at 20:47

## 2021-04-18 RX ADMIN — LISINOPRIL 2.5 MG: 5 TABLET ORAL at 09:06

## 2021-04-18 RX ADMIN — CEPHALEXIN 250 MG: 250 CAPSULE ORAL at 00:16

## 2021-04-18 RX ADMIN — INSULIN GLARGINE 23 UNITS: 100 INJECTION, SOLUTION SUBCUTANEOUS at 20:36

## 2021-04-18 RX ADMIN — ASPIRIN 81 MG: 81 TABLET, COATED ORAL at 20:38

## 2021-04-18 RX ADMIN — INSULIN LISPRO 10 UNITS: 100 INJECTION, SOLUTION INTRAVENOUS; SUBCUTANEOUS at 12:13

## 2021-04-18 RX ADMIN — CYCLOBENZAPRINE 10 MG: 10 TABLET, FILM COATED ORAL at 19:06

## 2021-04-18 RX ADMIN — ASPIRIN 81 MG: 81 TABLET, COATED ORAL at 09:06

## 2021-04-18 RX ADMIN — ROSUVASTATIN CALCIUM 40 MG: 20 TABLET, FILM COATED ORAL at 19:00

## 2021-04-18 RX ADMIN — CELECOXIB 100 MG: 100 CAPSULE ORAL at 07:52

## 2021-04-18 RX ADMIN — DOCUSATE SODIUM 50 MG AND SENNOSIDES 8.6 MG 1 TABLET: 8.6; 5 TABLET, FILM COATED ORAL at 20:38

## 2021-04-18 ASSESSMENT — PAIN DESCRIPTION - PROGRESSION
CLINICAL_PROGRESSION: NOT CHANGED

## 2021-04-18 ASSESSMENT — PAIN DESCRIPTION - ONSET
ONSET: ON-GOING

## 2021-04-18 ASSESSMENT — PAIN DESCRIPTION - PAIN TYPE
TYPE: SURGICAL PAIN

## 2021-04-18 ASSESSMENT — PAIN - FUNCTIONAL ASSESSMENT
PAIN_FUNCTIONAL_ASSESSMENT: PREVENTS OR INTERFERES SOME ACTIVE ACTIVITIES AND ADLS

## 2021-04-18 ASSESSMENT — PAIN DESCRIPTION - DESCRIPTORS
DESCRIPTORS: ACHING

## 2021-04-18 ASSESSMENT — PAIN DESCRIPTION - LOCATION
LOCATION: KNEE

## 2021-04-18 ASSESSMENT — PAIN SCALES - GENERAL
PAINLEVEL_OUTOF10: 8
PAINLEVEL_OUTOF10: 8
PAINLEVEL_OUTOF10: 7
PAINLEVEL_OUTOF10: 4
PAINLEVEL_OUTOF10: 0
PAINLEVEL_OUTOF10: 8
PAINLEVEL_OUTOF10: 7
PAINLEVEL_OUTOF10: 8
PAINLEVEL_OUTOF10: 8
PAINLEVEL_OUTOF10: 4

## 2021-04-18 ASSESSMENT — PAIN DESCRIPTION - ORIENTATION
ORIENTATION: RIGHT

## 2021-04-18 NOTE — PROGRESS NOTES
Physical Therapy  Facility/Department: [facility] 5T ORTHO/NEURO  Daily Treatment Note  NAME: Aurora Richter  : 1960  MRN: [id number]    Date of Service: 2021    Discharge Recommendations:    Aurora Richter scored a 17/24 on the AM-PAC short mobility form. Current research shows that an AM-PAC score of 17 or less is typically not associated with a discharge to the patient's home setting. Based on the patient's AM-PAC score and their current functional mobility deficits, it is recommended that the patient have 3-5 sessions per week of Physical Therapy at d/c to increase the patient's independence. Please see assessment section for further patient specific details. If patient discharges prior to next session this note will serve as a discharge summary. Please see below for the latest assessment towards goals. PT Equipment Recommendations  Equipment Needed: No    Assessment   Body structures, Functions, Activity limitations: Decreased functional mobility ; Decreased ROM; Decreased strength;Decreased endurance  Assessment: Slightly increased gt endurance this afternoon. Needs min to mod assist for bed mobility, CG/min assist for transfers and CGA for gt. All mobility slow and effortful. At risk for falls. Not safe to ambulate alone. Would benefit from continued IP PT to maximize mobility and independence  Treatment Diagnosis: impaired mobility s/p R TKR  PT Education: Goals;PT Role;Plan of Care;Gait Training  Patient Education: Pt verbalized understanding  REQUIRES PT FOLLOW UP: Yes     Patient Diagnosis(es): The encounter diagnosis was Primary osteoarthritis of right knee. has a past medical history of Adopted, Arthritis, Asthma, CAD (coronary artery disease), Depression, Diabetes mellitus ([address] GERD (gastroesophageal reflux disease), Hyperlipidemia, MI, old, Obesity, Urinary frequency, Urinary urgency, and Vitamin D deficiency.    has a past surgical history that includes Cholecystectomy; angioplasty (sept 2015); hernia repair; Testicle removal (Right); other surgical history (N/A, 10/20/2015); other surgical history; Andi-en-Y Gastric Bypass (11/19/2018); pr lap gastric bypass/andi-en-y (N/A, 11/19/2018); Stimulator Surgery; and Total knee arthroplasty (Right, 4/15/2021). Restrictions  Position Activity Restriction  Other position/activity restrictions: Full weight bearing on operative leg; Knee immobilizer until full quadriceps function present  Subjective   General  Chart Reviewed: Yes  Additional Pertinent Hx: s/p right total knee replacement using robotic assistance ([employer] Robot) on 4/15  Family / Caregiver Present: Yes(wife)  Subjective  Subjective: Pt found supine. Agreeable to PT. Just got back to bed short time ago. Pain Screening  Patient Currently in Pain: Yes(RLE, not rated, RN aware)  Vital Signs  Patient Currently in Pain: Yes(RLE, not rated, RN aware)       Orientation     Cognition      Objective   Bed mobility  Supine to Sit: Minimal assistance(for RLE, HOB elevated, with rail)  Sit to Supine: Moderate assistance(for RLE)  Transfers  Sit to Stand: Minimal Assistance(from bed)  Stand to sit: Contact guard assistance(to bed)  Ambulation  Ambulation?: Yes  Ambulation 1  Device: Rolling Walker  Other Apparatus: Knee Immobilizer  Assistance: Contact guard assistance  Quality of Gait: Pt with significant pes planus bilat, ER BLE, decreased bilat step length/height, heavy UE support on walker  Distance: 50'  Comments: Fatigued after ambulation        Exercises  Straight Leg Raise: mod assist x 10 reps RLE  Quad Sets: x 10 R max cues, minimal activation noted  Heelslides: x 10 mod to max assist RLE in supine  Gluteal Sets: x 10 indep  Hip Abduction: x 10 min A R  Knee Short Arc Quad: x 10 R mod to max A  Ankle Pumps: x 10 indep  Comments: Pt encouraged to perform QS and AP throughout the day.   Pt verbalized understanding                        G-Code     OutComes Score AM-PAC Score  AM-PAC Inpatient Mobility Raw Score : 17 (04/18/21 1036)  AM-PAC Inpatient T-Scale Score : 42.13 (04/18/21 1036)  Mobility Inpatient CMS 0-100% Score: 50.57 (04/18/21 1036)  Mobility Inpatient CMS G-Code Modifier : CK (04/18/21 1036)          Goals  Short term goals  Time Frame for Short term goals: dc  Short term goal 1: Transfers SBA. Ongoing  Short term goal 2: Ambulate [de-identified]' with RW SBA.  ongoing  Short term goal 3: up/down 4 curb steps with walker CGA- revised 4/16:  up/down 4 steps with B rails or 1 rail/AD CGA. Ongoing  Short term goal 4: R knee ROM 10-70.   Ongoing  Short term goal 5: Demo independence with R TKA ex x 10 reps   Ongoing  Patient Goals   Patient goals : home at [address] [place] [address]  Times per week: 7  Times per day: Twice a day  Current Treatment Recommendations: ROM, Strengthening, Balance Training, Functional Mobility Training, Transfer Training, Gait Training, Stair training, Safety Education & Training  Safety Devices  Type of devices: Call light within reach, Nurse notified, Left in bed, Bed alarm in place     Therapy Time   Individual Concurrent Group Co-treatment   Time In 1330         Time Out 1356         Minutes 26              Timed Code Treatment Minutes: 26      Total Treatment Minutes:  GEORGETTE Christina

## 2021-04-18 NOTE — PROGRESS NOTES
Patient is A/O x4, VSS, and pain is being managed per the [facility]. Patient is tolerating PO liquids and diet well. Patient voiding adequately with urinal.  Patient stating pain is very manageable with scheduled tylenol, last dose of oxy was prior to shift change. Will encourage patient to premedicate prior to work with physical therapy this morning. Fall precautions in place - bed alarm engaged, will continue to monitor and assess patient.

## 2021-04-18 NOTE — PLAN OF CARE
Problem: Falls - Risk of:  Goal: Will remain free from falls  Description: Will remain free from falls  Outcome: Ongoing  Note: Patient will remain free from falls. Patient will use call light to notify staff of needs prior to exiting the bed. Patient's bed will remain in lowest position with wheels locked and bed alarm engaged. Problem: Pain:  Goal: Pain level will decrease  Description: Pain level will decrease  Outcome: Ongoing  Note: Patient's pain will continue to improve and continue to be managed per the [facility].

## 2021-04-18 NOTE — PROGRESS NOTES
Physical Therapy  Facility/Department: [facility] 5T ORTHO/NEURO  Daily Treatment Note  NAME: Fabian Jarquin  : 1960  MRN: [id number]    Date of Service: 2021    Discharge Recommendations:    Fabian Jarquin scored a 17/24 on the AM-PAC short mobility form. Current research shows that an AM-PAC score of 17 or less is typically not associated with a discharge to the patient's home setting. Based on the patient's AM-PAC score and their current functional mobility deficits, it is recommended that the patient have 3-5 sessions per week of Physical Therapy at d/c to increase the patient's independence. Please see assessment section for further patient specific details. If patient discharges prior to next session this note will serve as a discharge summary. Please see below for the latest assessment towards goals. PT Equipment Recommendations  Equipment Needed: No    Assessment   Body structures, Functions, Activity limitations: Decreased functional mobility ; Decreased ROM; Decreased strength;Decreased endurance  Assessment: Slightly increased gt endurance this date however, fatigues quickly with activity. Needing min assist for bed mobility and transfers, CGA for gt. All mobility slow and effortful. Pt at risk for falls and not safe to ambulate alone. Would benefit from continued IP PT to maximize mobility/independence and to increase endurance/activity tolerance  Treatment Diagnosis: impaired mobility s/p R TKR  PT Education: Goals;PT Role;Plan of Care;Gait Training  Patient Education: Pt verbalized understanding  REQUIRES PT FOLLOW UP: Yes     Patient Diagnosis(es): The encounter diagnosis was Primary osteoarthritis of right knee. has a past medical history of Adopted, Arthritis, Asthma, CAD (coronary artery disease), Depression, Diabetes mellitus ([facility] [address] GERD (gastroesophageal reflux disease), Hyperlipidemia, MI, old, Obesity, Urinary frequency, Urinary urgency, and Vitamin D deficiency.    has a noted  Heelslides: x 10 mod to max assist RLE in supine, 5 reps in sitting  Gluteal Sets: x 10 indep  Hip Abduction: x 10 min A R  Knee Short Arc Quad: x 10 R mod A  Knee Active Range of Motion: 14-57 degrees (extension measured supine, flexion measured in sitting)  Ankle Pumps: x 10 indep  Comments: Pt encouraged to perform QS and AP throughout the day. Pt verbalized understanding                        G-Code     OutComes Score                                                     AM-PAC Score  AM-PAC Inpatient Mobility Raw Score : 17 (04/18/21 1036)  AM-PAC Inpatient T-Scale Score : 42.13 (04/18/21 1036)  Mobility Inpatient CMS 0-100% Score: 50.57 (04/18/21 1036)  Mobility Inpatient CMS G-Code Modifier : CK (04/18/21 1036)          Goals  Short term goals  Time Frame for Short term goals: dc  Short term goal 1: Transfers SBA. Ongoing  Short term goal 2: Ambulate [de-identified]' with RW SBA.  ongoing  Short term goal 3: up/down 4 curb steps with walker CGA- revised 4/16:  up/down 4 steps with B rails or 1 rail/AD CGA. Ongoing  Short term goal 4: R knee ROM 10-70.   Ongoing  Short term goal 5: Demo independence with R TKA ex x 10 reps   Ongoing  Patient Goals   Patient goals : home at [address] [place] [address]  Times per week: 7  Times per day: Twice a day  Current Treatment Recommendations: ROM, Strengthening, Balance Training, Functional Mobility Training, Transfer Training, Gait Training, Stair training, Safety Education & Training  Safety Devices  Type of devices: Call light within reach, Chair alarm in place, Nurse notified, Left in chair     Therapy Time   Individual Concurrent Group Co-treatment   Time In 0940         Time Out 1020         Minutes 40               Timed Code Treatment Minutes:40       Total Treatment Minutes:  [address], PT

## 2021-04-18 NOTE — PROGRESS NOTES
Occupational Therapy  No Treatment    Chart Reviewed. RN consulted. Attempted to see pt for therapy. Pt declining therapy due to eating lunch and fatigue. Will attempt to see pt later as schedule allows. Continue OT per POC.      [address], [address] [place]/L

## 2021-04-18 NOTE — PROGRESS NOTES
Hospitalist Progress Note      PCP: Yoselyn Perez MD    Date of Admission: 4/15/2021    Chief Complaint:     No chief complaint on file. knee pain    Subjective:  Patient seen and examined at the bedside.   No acute events overnight  Will increase lantus to 25u    PFHS: reviewed as documented 4/15/2021, no changes    Medications:  Reviewed    Infusion Medications    sodium chloride      sodium chloride Stopped (04/16/21 0756)    dextrose       Scheduled Medications    celecoxib  100 mg Oral BID WC    gabapentin  300 mg Oral TID    cephALEXin  250 mg Oral 4 times per day    insulin glargine  23 Units Subcutaneous Nightly    insulin lispro  10 Units Subcutaneous TID WC    Liraglutide  1.8 mg Subcutaneous Daily    lisinopril  2.5 mg Oral Daily    magnesium oxide  400 mg Oral Daily    pantoprazole  40 mg Oral QAM AC    potassium chloride  20 mEq Oral Daily    psyllium  1 packet Oral Daily    rosuvastatin  40 mg Oral QPM    sertraline  100 mg Oral Daily    cyanocobalamin  1,000 mcg Oral Daily    ferrous sulfate  325 mg Oral Daily    sodium chloride flush  5-40 mL Intravenous 2 times per day    acetaminophen  650 mg Oral Q6H    sennosides-docusate sodium  1 tablet Oral BID    aspirin  81 mg Oral BID    insulin lispro  0-12 Units Subcutaneous TID WC    insulin lispro  0-6 Units Subcutaneous Nightly     PRN Meds: cyclobenzaprine, albuterol, sodium chloride flush, sodium chloride, promethazine **OR** ondansetron, magnesium hydroxide, oxyCODONE **OR** oxyCODONE, glucose, dextrose, glucagon (rDNA), dextrose      Intake/Output Summary (Last 24 hours) at 4/18/2021 0903  Last data filed at 4/18/2021 0536  Gross per 24 hour   Intake 240 ml   Output 1050 ml   Net -810 ml       Physical Exam    /74   Pulse 80   Temp 97.9 °F (36.6 °C) (Oral)   Resp 18   Ht 6' (1.829 m)   Wt 295 lb (133.8 kg)   SpO2 90%   BMI 40.01 kg/m²     General appearance:  No acute distress, appears stated age  Eyes: Pupils equal, round, reactive to light, conjunctiva/corneas clear  Ears/Nose/Mouth/Throat: No external lesions or scars, hearing intact to voice  Neck: Trachea midline, no masses noted, no thyromegaly  Respiratory:  Non-labored breathing, clear to auscultation bilaterally  Cardiovascular: Regular rate and rhythm, no murmurs, gallops, or rubs  Abdomen: soft, non-tender, non-distended  Musculoskeletal: Warm, well perfused, no cyanosis or edema  Skin: normal color, no wounds noted  Psychiatric: A&Ox4, good insight and judgment    Labs:   Recent Labs     04/16/21  0504   WBC 14.8*   HGB 10.7*   HCT 32.8*        Recent Labs     04/16/21  0504      K 4.7      CO2 23   BUN 21*   CREATININE 1.2   CALCIUM 8.7     Recent Labs     04/15/21  1010   AST 41*   ALT 27   BILIDIR <0.2   BILITOT 0.4   ALKPHOS 77     Recent Labs     04/15/21  1010   INR 1.07     No results for input(s): CKTOTAL, TROPONINI in the last 72 hours. Urinalysis:    No results found for: Rand Damien, BACTERIA, RBCUA, BLOODU, SPECGRAV, Faust[address] [place] [address]    Radiology:  XR KNEE RIGHT (1-2 VIEWS)   Final Result   Impression:    Status post total knee arthroplasty. Assessment/Plan:    Active Hospital Problems    Diagnosis Date Noted    Primary osteoarthritis of right knee [M17.11] 04/15/2021       Plan:    # right total knee replacement using robotic assistance [facility]); all three components were cemented ; posterior cruciate retaining implants.  Medial parapatellar arthrotomy.  -management as per primary     # T2DM  -carb control diet  -hold metformin  -lantus w/ mealtime and correctional insulin  -continue gabapentin  -plan to dc on home regimen     # Asthma  -continue home inhalers  -breathing treatments PRN     # HLD  -continue statin     # Depression  -continue sertraline     # CAD  -continue asa     # Morbid obesity  -Complicating assessment and treatment.  Placing patient at risk for multiple co-morbidities as well as early death and contributing to the patient's presentation. Eve Zheng on weight loss.        # Other chronic conditions  -continue home management    DVT Prophylaxis: Per primary  Diet: DIET CARB CONTROL;  Code Status: Full Code    PT/OT Eval Status: per primary    Dispo: per primary    Brynn Valentino MD

## 2021-04-19 PROBLEM — L03.115 CELLULITIS OF RIGHT LEG: Status: ACTIVE | Noted: 2021-04-19

## 2021-04-19 PROBLEM — I87.2 EDEMA OF RIGHT LOWER LEG DUE TO VENOUS STASIS: Status: ACTIVE | Noted: 2021-04-19

## 2021-04-19 PROBLEM — R60.0 EDEMA OF RIGHT LOWER LEG DUE TO VENOUS STASIS: Status: ACTIVE | Noted: 2021-04-19

## 2021-04-19 PROBLEM — Z96.651 STATUS POST RIGHT KNEE REPLACEMENT: Status: ACTIVE | Noted: 2021-04-19

## 2021-04-19 LAB
GLUCOSE BLD-MCNC: 131 MG/DL (ref 70–99)
GLUCOSE BLD-MCNC: 152 MG/DL (ref 70–99)
GLUCOSE BLD-MCNC: 180 MG/DL (ref 70–99)
GLUCOSE BLD-MCNC: 184 MG/DL (ref 70–99)
PERFORMED ON: ABNORMAL

## 2021-04-19 PROCEDURE — 97110 THERAPEUTIC EXERCISES: CPT

## 2021-04-19 PROCEDURE — 1200000000 HC SEMI PRIVATE

## 2021-04-19 PROCEDURE — 99222 1ST HOSP IP/OBS MODERATE 55: CPT | Performed by: INTERNAL MEDICINE

## 2021-04-19 PROCEDURE — 6370000000 HC RX 637 (ALT 250 FOR IP): Performed by: ORTHOPAEDIC SURGERY

## 2021-04-19 PROCEDURE — 97530 THERAPEUTIC ACTIVITIES: CPT

## 2021-04-19 PROCEDURE — 2580000003 HC RX 258: Performed by: ORTHOPAEDIC SURGERY

## 2021-04-19 PROCEDURE — 97116 GAIT TRAINING THERAPY: CPT

## 2021-04-19 PROCEDURE — 97535 SELF CARE MNGMENT TRAINING: CPT

## 2021-04-19 RX ORDER — DOXYCYCLINE 100 MG/1
100 CAPSULE ORAL EVERY 12 HOURS SCHEDULED
Status: DISCONTINUED | OUTPATIENT
Start: 2021-04-19 | End: 2021-04-20 | Stop reason: HOSPADM

## 2021-04-19 RX ADMIN — GABAPENTIN 300 MG: 300 CAPSULE ORAL at 08:31

## 2021-04-19 RX ADMIN — INSULIN LISPRO 2 UNITS: 100 INJECTION, SOLUTION INTRAVENOUS; SUBCUTANEOUS at 08:33

## 2021-04-19 RX ADMIN — CEPHALEXIN 250 MG: 250 CAPSULE ORAL at 06:00

## 2021-04-19 RX ADMIN — CYANOCOBALAMIN TAB 1000 MCG 1000 MCG: 1000 TAB at 08:31

## 2021-04-19 RX ADMIN — DOCUSATE SODIUM 50 MG AND SENNOSIDES 8.6 MG 1 TABLET: 8.6; 5 TABLET, FILM COATED ORAL at 08:31

## 2021-04-19 RX ADMIN — ACETAMINOPHEN 650 MG: 325 TABLET ORAL at 06:00

## 2021-04-19 RX ADMIN — CYCLOBENZAPRINE 10 MG: 10 TABLET, FILM COATED ORAL at 21:01

## 2021-04-19 RX ADMIN — ACETAMINOPHEN 650 MG: 325 TABLET ORAL at 00:07

## 2021-04-19 RX ADMIN — ASPIRIN 81 MG: 81 TABLET, COATED ORAL at 08:31

## 2021-04-19 RX ADMIN — MAGNESIUM OXIDE TAB 400 MG (240 MG ELEMENTAL MG) 400 MG: 400 (240 MG) TAB at 08:30

## 2021-04-19 RX ADMIN — PANTOPRAZOLE SODIUM 40 MG: 40 TABLET, DELAYED RELEASE ORAL at 06:01

## 2021-04-19 RX ADMIN — ROSUVASTATIN CALCIUM 40 MG: 20 TABLET, FILM COATED ORAL at 18:37

## 2021-04-19 RX ADMIN — INSULIN LISPRO 2 UNITS: 100 INJECTION, SOLUTION INTRAVENOUS; SUBCUTANEOUS at 12:54

## 2021-04-19 RX ADMIN — INSULIN GLARGINE 23 UNITS: 100 INJECTION, SOLUTION SUBCUTANEOUS at 21:03

## 2021-04-19 RX ADMIN — INSULIN LISPRO 10 UNITS: 100 INJECTION, SOLUTION INTRAVENOUS; SUBCUTANEOUS at 16:45

## 2021-04-19 RX ADMIN — INSULIN LISPRO 10 UNITS: 100 INJECTION, SOLUTION INTRAVENOUS; SUBCUTANEOUS at 12:56

## 2021-04-19 RX ADMIN — OXYCODONE 10 MG: 5 TABLET ORAL at 15:04

## 2021-04-19 RX ADMIN — OXYCODONE 10 MG: 5 TABLET ORAL at 21:01

## 2021-04-19 RX ADMIN — DOXYCYCLINE 100 MG: 100 CAPSULE ORAL at 16:48

## 2021-04-19 RX ADMIN — Medication 5 ML: at 08:32

## 2021-04-19 RX ADMIN — INSULIN LISPRO 10 UNITS: 100 INJECTION, SOLUTION INTRAVENOUS; SUBCUTANEOUS at 08:36

## 2021-04-19 RX ADMIN — GABAPENTIN 300 MG: 300 CAPSULE ORAL at 21:02

## 2021-04-19 RX ADMIN — CEPHALEXIN 250 MG: 250 CAPSULE ORAL at 00:07

## 2021-04-19 RX ADMIN — CEPHALEXIN 250 MG: 250 CAPSULE ORAL at 12:54

## 2021-04-19 RX ADMIN — ACETAMINOPHEN 650 MG: 325 TABLET ORAL at 23:35

## 2021-04-19 RX ADMIN — OXYCODONE 10 MG: 5 TABLET ORAL at 03:36

## 2021-04-19 RX ADMIN — LISINOPRIL 2.5 MG: 5 TABLET ORAL at 08:29

## 2021-04-19 RX ADMIN — ASPIRIN 81 MG: 81 TABLET, COATED ORAL at 21:02

## 2021-04-19 RX ADMIN — Medication 10 ML: at 21:01

## 2021-04-19 RX ADMIN — INSULIN LISPRO 1 UNITS: 100 INJECTION, SOLUTION INTRAVENOUS; SUBCUTANEOUS at 21:03

## 2021-04-19 RX ADMIN — FERROUS SULFATE TAB 325 MG (65 MG ELEMENTAL FE) 325 MG: 325 (65 FE) TAB at 08:31

## 2021-04-19 RX ADMIN — ACETAMINOPHEN 650 MG: 325 TABLET ORAL at 18:37

## 2021-04-19 RX ADMIN — CELECOXIB 100 MG: 100 CAPSULE ORAL at 16:48

## 2021-04-19 RX ADMIN — SERTRALINE HYDROCHLORIDE 100 MG: 100 TABLET ORAL at 08:31

## 2021-04-19 RX ADMIN — POTASSIUM CHLORIDE 20 MEQ: 1500 TABLET, EXTENDED RELEASE ORAL at 08:31

## 2021-04-19 RX ADMIN — CELECOXIB 100 MG: 100 CAPSULE ORAL at 08:31

## 2021-04-19 RX ADMIN — GABAPENTIN 300 MG: 300 CAPSULE ORAL at 15:03

## 2021-04-19 RX ADMIN — ACETAMINOPHEN 650 MG: 325 TABLET ORAL at 12:54

## 2021-04-19 RX ADMIN — OXYCODONE 10 MG: 5 TABLET ORAL at 08:31

## 2021-04-19 ASSESSMENT — PAIN DESCRIPTION - LOCATION
LOCATION: KNEE

## 2021-04-19 ASSESSMENT — PAIN SCALES - GENERAL
PAINLEVEL_OUTOF10: 7
PAINLEVEL_OUTOF10: 7
PAINLEVEL_OUTOF10: 6
PAINLEVEL_OUTOF10: 4
PAINLEVEL_OUTOF10: 7
PAINLEVEL_OUTOF10: 5
PAINLEVEL_OUTOF10: 0
PAINLEVEL_OUTOF10: 4
PAINLEVEL_OUTOF10: 8
PAINLEVEL_OUTOF10: 8
PAINLEVEL_OUTOF10: 0

## 2021-04-19 ASSESSMENT — PAIN DESCRIPTION - PROGRESSION
CLINICAL_PROGRESSION: GRADUALLY IMPROVING
CLINICAL_PROGRESSION: NOT CHANGED

## 2021-04-19 ASSESSMENT — PAIN - FUNCTIONAL ASSESSMENT: PAIN_FUNCTIONAL_ASSESSMENT: PREVENTS OR INTERFERES SOME ACTIVE ACTIVITIES AND ADLS

## 2021-04-19 ASSESSMENT — PAIN DESCRIPTION - ORIENTATION
ORIENTATION: RIGHT

## 2021-04-19 ASSESSMENT — PAIN DESCRIPTION - DESCRIPTORS
DESCRIPTORS: ACHING

## 2021-04-19 ASSESSMENT — PAIN DESCRIPTION - ONSET
ONSET: ON-GOING
ONSET: ON-GOING
ONSET: GRADUAL

## 2021-04-19 ASSESSMENT — PAIN DESCRIPTION - PAIN TYPE
TYPE: SURGICAL PAIN

## 2021-04-19 ASSESSMENT — PAIN DESCRIPTION - FREQUENCY: FREQUENCY: CONTINUOUS

## 2021-04-19 NOTE — PROGRESS NOTES
[facility] Progress Note        Subjective:  Pt is resting in bed with minimal complaints of discomfort. Blood pressure (!) 156/70, pulse 74, temperature 97.7 °F (36.5 °C), temperature source Oral, resp. rate 16, height 6' (1.829 m), weight 295 lb (133.8 kg), SpO2 96 %. PHYSICAL EXAM:   R lower extremity  Dressing clean and dry  +EHL/FHL/ADF  Sensation intact to light touch distally  Skin warm and well perfused    Erythematous pretibial region of erythema which is is warm to touch. Previous region of venous stasis preop    HgB:    Lab Results   Component Value Date    HGB 10.7 04/16/2021       ASSESSMENT AND PLAN:    64 y.o. male status post R TKA    Severe unanticipated difficulty in mobilizing in the postop period has led to lengthened stay. Additionally, erythema in pretibial region concerning for cellulitis. Will ask Infectious disease team to weigh in regarding possible need for antibiotics beyond typical prophylactic dosing. Will start doxycycline in the mean time. Wound care to see for recs regarding need for compressive wrapping etc.     1:  Weight bearing as tolerated   2:  Deep venous thrombosis prophylaxis: ASA BID  3:  Continue mobilization, physical therapy  4:  D/C Plan:  Likely SNF, pending ID and wound team consult and placement finalization.       Nyla Ceja MD

## 2021-04-19 NOTE — PROGRESS NOTES
Hospitalist Progress Note      PCP: Brandon Kirkland MD    Date of Admission: 4/15/2021    Chief Complaint:   knee pain    Subjective:  Patient seen and examined. Doing well, no complains.     PFHS: reviewed as documented 4/15/2021, no changes    Medications:  Reviewed    Infusion Medications    sodium chloride      sodium chloride Stopped (04/16/21 0756)    dextrose       Scheduled Medications    doxycycline monohydrate  100 mg Oral 2 times per day    celecoxib  100 mg Oral BID WC    gabapentin  300 mg Oral TID    insulin glargine  23 Units Subcutaneous Nightly    insulin lispro  10 Units Subcutaneous TID WC    Liraglutide  1.8 mg Subcutaneous Daily    lisinopril  2.5 mg Oral Daily    magnesium oxide  400 mg Oral Daily    pantoprazole  40 mg Oral QAM AC    potassium chloride  20 mEq Oral Daily    psyllium  1 packet Oral Daily    rosuvastatin  40 mg Oral QPM    sertraline  100 mg Oral Daily    cyanocobalamin  1,000 mcg Oral Daily    ferrous sulfate  325 mg Oral Daily    sodium chloride flush  5-40 mL Intravenous 2 times per day    acetaminophen  650 mg Oral Q6H    sennosides-docusate sodium  1 tablet Oral BID    aspirin  81 mg Oral BID    insulin lispro  0-12 Units Subcutaneous TID WC    insulin lispro  0-6 Units Subcutaneous Nightly     PRN Meds: cyclobenzaprine, albuterol, sodium chloride flush, sodium chloride, promethazine **OR** ondansetron, magnesium hydroxide, oxyCODONE **OR** oxyCODONE, glucose, dextrose, glucagon (rDNA), dextrose      Intake/Output Summary (Last 24 hours) at 4/19/2021 1731  Last data filed at 4/19/2021 1510  Gross per 24 hour   Intake 820 ml   Output 1550 ml   Net -730 ml       Physical Exam    BP (!) 156/70   Pulse 74   Temp 97.7 °F (36.5 °C) (Oral)   Resp 16   Ht 6' (1.829 m)   Wt 295 lb (133.8 kg)   SpO2 96%   BMI 40.01 kg/m²     General appearance:  No acute distress, appears stated age  Eyes: Pupils equal, round, reactive to light, conjunctiva/corneas clear  Ears/Nose/Mouth/Throat: No external lesions or scars, hearing intact to voice  Neck: Trachea midline, no masses noted, no thyromegaly  Respiratory:  Non-labored breathing, clear to auscultation bilaterally  Cardiovascular: Regular rate and rhythm, no murmurs, gallops, or rubs  Abdomen: soft, non-tender, non-distended  Musculoskeletal: Warm, well perfused, no cyanosis or edema  Skin: normal color, no wounds noted  Psychiatric: A&Ox4, good insight and judgment    Labs:   No results for input(s): WBC, HGB, HCT, PLT in the last 72 hours. No results for input(s): NA, K, CL, CO2, BUN, CREATININE, CALCIUM, PHOS in the last 72 hours. Invalid input(s): MAGNES  No results for input(s): AST, ALT, BILIDIR, BILITOT, ALKPHOS in the last 72 hours. No results for input(s): INR in the last 72 hours. No results for input(s): [place] in the last 72 hours. Urinalysis:    No results found for: Davian Kinsey, BACTERIA, RBCUA, BLOODU, SPECGRAV, [address] [place] [address]    Radiology:  XR KNEE RIGHT (1-2 VIEWS)   Final Result   Impression:    Status post total knee arthroplasty. Assessment/Plan:    Active Hospital Problems    Diagnosis Date Noted    Primary osteoarthritis of right knee [M17.11] 04/15/2021       Plan:    # right total knee replacement using robotic assistance [facility]); all three components were cemented ; posterior cruciate retaining implants.  Medial parapatellar arthrotomy.  -management as per primary  On Doxycycline, ID consulted by primary team.      # T2DM  -carb control diet  -hold metformin  -lantus w/ mealtime and correctional insulin  -continue gabapentin   BG fair control, and says he has good understanding of his diabetes control.      # Asthma  -continue home inhalers  -breathing treatments PRN     # HLD  -continue statin     # Depression  -continue sertraline     # CAD  -continue asa     # Morbid obesity  -Complicating assessment and treatment.  Placing patient at risk for multiple co-morbidities as well as early death and contributing to the patient's presentation. Keya Gomez on weight loss.        # Other chronic conditions  -continue home management    DVT Prophylaxis: ASA 81 mg bid Per primary  Diet: DIET CARB CONTROL;  Code Status: Full Code    PT/OT Eval Status: [facility] 17/24.      Dispo: Pending placement     Jose A Shaw MD   Hospitalist

## 2021-04-19 NOTE — PROGRESS NOTES
Physical Therapy  Facility/Department: [facility] 5T ORTHO/NEURO  Daily Treatment Note  NAME: Denisa Bravo  : 1960  MRN: [id number]    Date of Service: 2021    Discharge Recommendations:  Patient would benefit from continued therapy after discharge   PT Equipment Recommendations  Equipment Needed: (defer for now. if pt goes home, recommend bariatric RW.)    Assessment   Body structures, Functions, Activity limitations: Decreased functional mobility ; Decreased ROM; Decreased strength;Decreased endurance  Assessment: Pt progressing with bed mobility, however, requiring increased assistance for transfers today. Ambulation distance limited by fatigue & dizziness. Remains unable to attempt stairs. Safety concerns for pt to return home upon DC. Recommend further inpt PT. Will follow per plan of care. Treatment Diagnosis: impaired mobility s/p R TKR  Prognosis: Good  PT Education: Plan of Care;Transfer Training;General Safety;Gait Training;Functional Mobility Training;Home Exercise Program  REQUIRES PT FOLLOW UP: Yes  Activity Tolerance  Activity Tolerance: Patient limited by fatigue;Patient limited by pain     Patient Diagnosis(es): The encounter diagnosis was Primary osteoarthritis of right knee. has a past medical history of Adopted, Arthritis, Asthma, CAD (coronary artery disease), Depression, Diabetes mellitus ([facility] [address] GERD (gastroesophageal reflux disease), Hyperlipidemia, MI, old, Obesity, Urinary frequency, Urinary urgency, and Vitamin D deficiency. has a past surgical history that includes Cholecystectomy; angioplasty (2015); hernia repair; Testicle removal (Right); other surgical history (N/A, 10/20/2015); other surgical history; Otilia-en-Y Gastric Bypass (2018); pr lap gastric bypass/otilia-en-y (N/A, 2018); Stimulator Surgery; and Total knee arthroplasty (Right, 4/15/2021).     Restrictions  Position Activity Restriction  Other position/activity restrictions: Full weight bearing on to don KI, shoes & socks. 2nd session:  Pt supine in bed & agreeable to PT. Wife present. Spoke to MD who ok'd trying ambulation without immobilizer. Supine LE there ex performed as above, except for pt IND with hip abd, & min A for first 5 reps SLR, & IND for final 5 reps. Supine->sit SBA with HOB elevated, pt using rail. Sit<->stand min A from bed. Verbal cues required. Amb 60 ft with RW & CGA. Step-to pattern, gait as above. Sit->supine SBA with HOB flat (effortful). Pt able to scoot up supine with SBA, use of rails & verbal cues. Alarm on, call light in reach, ice packs filled up after treatment. AM-PAC Score  AM-PAC Inpatient Mobility Raw Score : 17 (04/19/21 1300)  AM-PAC Inpatient T-Scale Score : 42.13 (04/19/21 1300)  Mobility Inpatient CMS 0-100% Score: 50.57 (04/19/21 1300)  Mobility Inpatient CMS G-Code Modifier : CK (04/19/21 1300)          Goals  Short term goals  Time Frame for Short term goals: dc  Short term goal 1: Transfers SBA. Ongoing  Short term goal 2: Ambulate [de-identified]' with RW SBA.  ongoing  Short term goal 3: up/down 4 curb steps with walker CGA- revised 4/16:  up/down 4 steps with B rails or 1 rail/AD CGA. Ongoing  Short term goal 4: R knee ROM 10-70.   Ongoing  Short term goal 5: Demo independence with R TKA ex x 10 reps   Ongoing  Patient Goals   Patient goals : pt hoping for SNF    Plan    Plan  Times per week: 7  Times per day: Twice a day  Current Treatment Recommendations: ROM, Strengthening, Balance Training, Functional Mobility Training, Transfer Training, Gait Training, Stair training, Safety Education & Training  Safety Devices  Type of devices: Call light within reach, Chair alarm in place, Left in chair, Nurse notified     Therapy Time   Individual Concurrent Group Co-treatment   Time In [phone]         Time Out 1011         Minutes 73               Second Session Therapy Time:   Individual Concurrent Group Co-treatment

## 2021-04-19 NOTE — CONSULTS
Infectious Diseases Inpatient Consult Note    Reason for Consult:   R leg redness, possible cellulitis  Requesting Physician:   Dr Carlos Tolentino  Primary Care Physician:  Shae Tapia MD  History Obtained From:   Pt, EPIC    Admit Date: 4/15/2021  Hospital Day: 5    CHIEF COMPLAINT:     R leg redness    HISTORY OF PRESENT ILLNESS:      65 yo man with hx obesity (BMI 40), CAD, DM, OA, depression, HL, DEN  Pt reports increase in R leg size and R LE redness at baseline    Pt had primary R TKA on 4/15.     He has had postop pain  Started on cephalexin after surg (4/16 - 19)    Today 4/19, pt felt to have increase in R lower leg redness, warmth by Ortho  Antibiotic changed to doxycycline       Past Medical History:    Past Medical History:   Diagnosis Date    Adopted     Arthritis     Asthma     CAD (coronary artery disease)     Depression     Diabetes mellitus ([address]     GERD (gastroesophageal reflux disease)     Hyperlipidemia     MI, old 2010    Obesity     Urinary frequency     Urinary urgency     Vitamin D deficiency        Past Surgical History:    Past Surgical History:   Procedure Laterality Date    ANGIOPLASTY  sept 2015    stent    CHOLECYSTECTOMY      HERNIA REPAIR      three    OTHER SURGICAL HISTORY N/A 10/20/2015    LAPAROSCOPIC SLEEVE GASTRECTOMY           OTHER SURGICAL HISTORY      interstim     bladder and bowel Right Back Hip    AR LAP GASTRIC BYPASS/HARJIT-EN-Y N/A 11/19/2018    LAPAROSCOPIC GASTRIC BYPASS WITH POSSIBLE HIATAL HERNIA REPAIR performed by Leida Rizo DO at [address]  11/19/2018    LAPAROSCOPIC GASTRIC BYPASS WITH POSSIBLE HIATAL HERNIA    STIMULATOR SURGERY      TESTICLE REMOVAL Right     TOTAL KNEE ARTHROPLASTY Right 4/15/2021    RIGHT TOTAL KNEE ARTHROPLASTY CARLOS performed by Elizabeth Elam MD at [address]       Current Medications:     doxycycline monohydrate  100 mg Oral 2 times per day    celecoxib  100 mg Oral BID WC    gabapentin  300 mg Oral TID    insulin glargine  23 Units Subcutaneous Nightly    insulin lispro  10 Units Subcutaneous TID WC    Liraglutide  1.8 mg Subcutaneous Daily    lisinopril  2.5 mg Oral Daily    magnesium oxide  400 mg Oral Daily    pantoprazole  40 mg Oral QAM AC    potassium chloride  20 mEq Oral Daily    psyllium  1 packet Oral Daily    rosuvastatin  40 mg Oral QPM    sertraline  100 mg Oral Daily    cyanocobalamin  1,000 mcg Oral Daily    ferrous sulfate  325 mg Oral Daily    sodium chloride flush  5-40 mL Intravenous 2 times per day    acetaminophen  650 mg Oral Q6H    sennosides-docusate sodium  1 tablet Oral BID    aspirin  81 mg Oral BID    insulin lispro  0-12 Units Subcutaneous TID WC    insulin lispro  0-6 Units Subcutaneous Nightly       Allergies:  Patient has no known allergies. Social History:    TOBACCO:    None - past cig use  ETOH:    None   DRUGS:   None   MARITAL STATUS:      OCCUPATION:       Family History:   No immunodeficiency    REVIEW OF SYSTEMS:    No fever / chills / sweats. No weight loss. No visual change, eye pain, eye discharge. No oral lesion, sore throat, dysphagia. Denies cough / sputum. Denies chest pain, palpitations. Denies n / v / abd pain. No diarrhea. Denies dysuria or change in urinary function. Denies joint swelling or pain. No myalgia, arthralgia. Denies skin changes, itching  Denies focal weakness, sensory change or other neurologic symptom    Denies new / worse depression, psychiatric symptoms    PHYSICAL EXAM:      Vitals:    BP (!) 156/70   Pulse 74   Temp 97.7 °F (36.5 °C) (Oral)   Resp 16   Ht 6' (1.829 m)   Wt 295 lb (133.8 kg)   SpO2 96%   BMI 40.01 kg/m²     GENERAL: No apparent distress.     HEENT: Membranes moist, no oral lesion, PERRL  NECK:  Supple, no lymphadenopathy  LUNGS: Clear b/l, no rales, no dullness  CARDIAC: RRR, no murmur appreciated  ABD:  + BS, soft / NT  EXT:  No rash, no edema, no lesions  R leg swelling / edema, lower leg with red patch, warm.   surg wound with dressing   NEURO: No focal neurologic findings  PSYCH: Orientation, sensorium, mood normal  LINES:  Peripheral iv    DATA:    Lab Results   Component Value Date    WBC 14.8 (H) 2021    HGB 10.7 (L) 2021    HCT 32.8 (L) 2021    MCV 87.5 2021     2021     Lab Results   Component Value Date    CREATININE 1.2 2021    BUN 21 (H) 2021     2021    K 4.7 2021     2021    CO2 23 2021       Hepatic Function Panel:   Lab Results   Component Value Date    ALKPHOS 77 04/15/2021    ALT 27 04/15/2021    AST 41 04/15/2021    PROT 7.0 04/15/2021    BILITOT 0.4 04/15/2021    BILIDIR <0.2 04/15/2021    IBILI see below 04/15/2021    LABALBU 3.7 04/15/2021       Micro:  None     Imagin/15 R knee - 'Status post total knee arthroplasty'      IMPRESSION:      Patient Active Problem List   Diagnosis    Morbid obesity ([facility] [address]    Primary osteoarthritis of right knee       Hx obesity (BMI 40), CAD, DM, OA, depression, HL, DEN  R LE venous stasis -  increase in R leg size and R LE redness at baseline    4/15 - primary R TKA   - postop pain  - cephalexin  after surg    R LE erythema / warm after surg - likely postsurg, worsening of venous stasis    RECOMMENDATIONS:    Cont doxycycline 100 bid x 10 days  R LE compression - stocking or ACE    Discussed with pt, RN  Karen Wise MD

## 2021-04-19 NOTE — CARE COORDINATION
Brittny Rivas and/or his family were provided with choice of provider; he and/or his family are in agreement with the discharge plan at this time.     Care Transition Patient: JOLENE Monson  [facility]   Case Management Department  Ph: [phone]

## 2021-04-19 NOTE — PLAN OF CARE
Problem: Mobility - Impaired:  Goal: Mobility will improve  Description: Mobility will improve  Outcome: Ongoing     Problem: Pain:  Goal: Control of acute pain  Description: Control of acute pain  Outcome: Ongoing     Problem: Pain:  Goal: Pain level will decrease  Description: Pain level will decrease  Outcome: Ongoing     Problem: Skin Integrity:  Goal: Absence of new skin breakdown  Description: Absence of new skin breakdown  Outcome: Ongoing     Problem: Falls - Risk of:  Goal: Will remain free from falls  Description: Will remain free from falls  Outcome: Ongoing

## 2021-04-19 NOTE — PROGRESS NOTES
Occupational Therapy  Facility/Department: [facility] 5T ORTHO/NEURO  Daily Treatment Note  NAME: Maik Barrett  : 1960  MRN: [id number]    Date of Service: 2021    Discharge Recommendations:    Maik Barrett scored a 15/24 on the AM-PAC ADL Inpatient form. Current research shows that an AM-PAC score of 17 or less is typically not associated with a discharge to the patient's home setting. Based on the patient's AM-PAC score and their current ADL deficits, it is recommended that the patient have 3-5 sessions per week of Occupational Therapy at d/c to increase the patient's independence. Please see assessment section for further patient specific details. If patient discharges prior to next session this note will serve as a discharge summary. Please see below for the latest assessment towards goals. OT Equipment Recommendations  Other: defer to next setting    Assessment   Performance deficits / Impairments: Decreased functional mobility ; Decreased ADL status; Decreased endurance;Decreased balance  Assessment: Pt requiring assist for all transfers varying from Min A (raised toilet) and Max A (lower level padded bench). Pt requires up to [address] for dynamic standing balance. Pt would  benefit from inpt therapy at d/c to maximize functional independence. Continue with POC. Treatment Diagnosis: impaired ADLs / functional transfers / decreased endurance  Prognosis: Fair;Good  OT Education: OT Role;Plan of Care;Transfer Training  Patient Education: reinforce as needed  REQUIRES OT FOLLOW UP: Yes  Activity Tolerance  Activity Tolerance: Patient limited by fatigue;Patient Tolerated treatment well  Safety Devices  Safety Devices in place: Yes  Type of devices: Left in chair;Chair alarm in place(Pt with PT at [facility])         Patient Diagnosis(es): The encounter diagnosis was Primary osteoarthritis of right knee.       has a past medical history of Adopted, Arthritis, Asthma, CAD (coronary artery disease), Depression, Diabetes mellitus ([facility] [address] GERD (gastroesophageal reflux disease), Hyperlipidemia, MI, old, Obesity, Urinary frequency, Urinary urgency, and Vitamin D deficiency. has a past surgical history that includes Cholecystectomy; angioplasty (sept 2015); hernia repair; Testicle removal (Right); other surgical history (N/A, 10/20/2015); other surgical history; Otilia-en-Y Gastric Bypass (11/19/2018); pr lap gastric bypass/otilia-en-y (N/A, 11/19/2018); Stimulator Surgery; and Total knee arthroplasty (Right, 4/15/2021). Restrictions  Position Activity Restriction  Other position/activity restrictions: Full weight bearing on operative leg; Knee immobilizer until full quadriceps function present  Subjective   General  Chart Reviewed: Yes  Additional Pertinent Hx: Admit to observation 4/15 s/p R TOTAL KNEE ARTHROPLASTY CARLOS                           PMHX: CAD, DM, HLD, Depression,Obesity, Gasric bypass 2018,  Response to previous treatment: Patient with no complaints from previous session  Family / Caregiver Present: Yes(wife Gisselle)  Diagnosis: R TKA -Carlos on 4/15  Subjective  Subjective: Pt supine in bed upon entry and agreeable to therapy session. General Comment  Comments: Pt Dependent to [address]. Pt supine to sit SBA with HOB raised. Pt sit EOB Supervision. Pt sit to stand Mod A from EOB. Pt ambulated Min A ~15 ft to toilet. Pt toilet transfer [address] [place] [address], pt Min A for balance for hygiene care in stance (no brief). Pt ambulated several ft to sink Min A. Pt in stance CGA/Min A with left lateral lean for grooming (wash hands/wash face/oral care). Pt ambulated into hallway ~20 ft at CGA/Min A. Pt with report of dizziness, stand to sit Mod A on lower level padded bench. Pt /81. After rest break pt reports no longer dizzy. Sit to stand Max A from lower level padded bench surface. Pt ambulated ~12 ft into room and recliner brought to pt. Pt stand to sit Min A. Pt with PT at EOS.   Pain Assessment  Patient's Stated Pain Goal: 8  Pain Type: Surgical pain  Pain Location: Knee  Pain Orientation: Right  Pain Descriptors: Aching  Pre Treatment Pain Screening  Intervention List: Patient able to continue with treatment;Patient declined any intervention  Comments / Details: Pt reports already received pain meds from RN  Vital Signs  Patient Currently in Pain: Yes   Orientation  Orientation  Overall Orientation Status: Within Functional Limits  Objective    ADL  Grooming: Minimal assistance(CGA up to Min A for balance)  LE Dressing: (Dependent for socks/shoes (Brief/pants not assessesd pt declined))  Toileting: Minimal assistance(Min A for balance during standing hygiene care)        Balance  Sitting Balance: Supervision  Standing Balance: Minimal assistance(CGA static - Min A dynamic ADLs)  Standing Balance  Time: ~12 min total  Activity: to/from bathroomm, ambulation into hallway, toilet transfer/toileting, in stance to groom  Functional Mobility  Functional - Mobility Device: Rolling Walker  Activity: To/from bathroom; Other  Assist Level: Minimal assistance  Toilet Transfers  Toilet - Technique: Ambulating  Equipment Used: Raised toilet seat with rails  Toilet Transfer: Minimal assistance  Bed mobility  Supine to Sit: Stand by assistance  Transfers  Sit to stand: Maximum assistance(Pt varied Max A (lower level padded bench), Mod A (EOB))  Stand to sit: Minimal assistance                       Cognition  Overall Cognitive Status: Exceptions  Safety Judgement: Decreased awareness of need for safety                                         Plan   Plan  Times per week: 7x  Times per day: Daily  Current Treatment Recommendations: Functional Mobility Training, Endurance Training, Safety Education & Training, Self-Care / ADL, Equipment Evaluation, Education, & procurement, Patient/Caregiver Education & Training  G-Code     OutComes Score                                                  AM-PAC Score        AM-PAC Inpatient Daily Activity Raw Score: 15 (04/19/21 1346)  AM-PAC Inpatient ADL T-Scale Score : 34.69 (04/19/21 1346)  ADL Inpatient CMS 0-100% Score: 56.46 (04/19/21 1346)  ADL Inpatient CMS G-Code Modifier : CK (04/19/21 1346)    Goals  Short term goals  Time Frame for Short term goals: at d/c  Short term goal 1: Stance x 6 mins with Supervision for ADLs - goal not mmet 4/19  Short term goal 2: LE Dressing with CGA and AE prn - pt Dependent for shoes/socks 4/19 goal not met  Short term goal 3: Commode transers with SBA /RTS prn - goal not met Min A 4/19  Patient Goals   Patient goals : Be independent       Therapy Time   Individual Concurrent Group Co-treatment   Time In 0920         Time Out 0958         Minutes 38         Timed Code Treatment Minutes: [address], [address]

## 2021-04-19 NOTE — PROGRESS NOTES
Patient A&Ox4, VSS. Surgical dressing clean, dry, and intact. Neuro checks unchanged, pt reports baseline numbness and tingling in bilateral feet. Pain managed with oral medications. Patient ambulating with walker and gait belt, tolerating fairly well. Patient anticipating discharge to SNF once precert obtained. Will continue to monitor.

## 2021-04-20 VITALS
HEART RATE: 81 BPM | OXYGEN SATURATION: 92 % | BODY MASS INDEX: 39.96 KG/M2 | SYSTOLIC BLOOD PRESSURE: 118 MMHG | TEMPERATURE: 98.2 F | HEIGHT: 72 IN | DIASTOLIC BLOOD PRESSURE: 73 MMHG | RESPIRATION RATE: 16 BRPM | WEIGHT: 295 LBS

## 2021-04-20 LAB
GLUCOSE BLD-MCNC: 157 MG/DL (ref 70–99)
GLUCOSE BLD-MCNC: 166 MG/DL (ref 70–99)
PERFORMED ON: ABNORMAL
PERFORMED ON: ABNORMAL

## 2021-04-20 PROCEDURE — 97530 THERAPEUTIC ACTIVITIES: CPT

## 2021-04-20 PROCEDURE — 97535 SELF CARE MNGMENT TRAINING: CPT

## 2021-04-20 PROCEDURE — 97116 GAIT TRAINING THERAPY: CPT

## 2021-04-20 PROCEDURE — 97110 THERAPEUTIC EXERCISES: CPT

## 2021-04-20 PROCEDURE — 6370000000 HC RX 637 (ALT 250 FOR IP): Performed by: ORTHOPAEDIC SURGERY

## 2021-04-20 PROCEDURE — 99232 SBSQ HOSP IP/OBS MODERATE 35: CPT | Performed by: INTERNAL MEDICINE

## 2021-04-20 PROCEDURE — 2580000003 HC RX 258: Performed by: ORTHOPAEDIC SURGERY

## 2021-04-20 RX ORDER — INSULIN LISPRO 100 [IU]/ML
10 INJECTION, SOLUTION INTRAVENOUS; SUBCUTANEOUS
COMMUNITY
Start: 2021-04-20

## 2021-04-20 RX ORDER — CELECOXIB 100 MG/1
100 CAPSULE ORAL 2 TIMES DAILY WITH MEALS
Qty: 60 CAPSULE | Refills: 3
Start: 2021-04-20

## 2021-04-20 RX ORDER — DOXYCYCLINE 100 MG/1
100 CAPSULE ORAL 2 TIMES DAILY
Qty: 20 CAPSULE | Refills: 0
Start: 2021-04-20 | End: 2021-04-30

## 2021-04-20 RX ADMIN — POTASSIUM CHLORIDE 20 MEQ: 1500 TABLET, EXTENDED RELEASE ORAL at 10:34

## 2021-04-20 RX ADMIN — INSULIN LISPRO 2 UNITS: 100 INJECTION, SOLUTION INTRAVENOUS; SUBCUTANEOUS at 11:55

## 2021-04-20 RX ADMIN — INSULIN LISPRO 10 UNITS: 100 INJECTION, SOLUTION INTRAVENOUS; SUBCUTANEOUS at 11:53

## 2021-04-20 RX ADMIN — ACETAMINOPHEN 650 MG: 325 TABLET ORAL at 05:23

## 2021-04-20 RX ADMIN — SERTRALINE HYDROCHLORIDE 100 MG: 100 TABLET ORAL at 09:49

## 2021-04-20 RX ADMIN — LISINOPRIL 2.5 MG: 5 TABLET ORAL at 09:49

## 2021-04-20 RX ADMIN — CYCLOBENZAPRINE 10 MG: 10 TABLET, FILM COATED ORAL at 05:23

## 2021-04-20 RX ADMIN — GABAPENTIN 300 MG: 300 CAPSULE ORAL at 13:56

## 2021-04-20 RX ADMIN — CYANOCOBALAMIN TAB 1000 MCG 1000 MCG: 1000 TAB at 09:49

## 2021-04-20 RX ADMIN — ASPIRIN 81 MG: 81 TABLET, COATED ORAL at 09:48

## 2021-04-20 RX ADMIN — PANTOPRAZOLE SODIUM 40 MG: 40 TABLET, DELAYED RELEASE ORAL at 05:23

## 2021-04-20 RX ADMIN — CYCLOBENZAPRINE 10 MG: 10 TABLET, FILM COATED ORAL at 13:56

## 2021-04-20 RX ADMIN — OXYCODONE 10 MG: 5 TABLET ORAL at 03:04

## 2021-04-20 RX ADMIN — OXYCODONE 10 MG: 5 TABLET ORAL at 07:05

## 2021-04-20 RX ADMIN — CELECOXIB 100 MG: 100 CAPSULE ORAL at 10:19

## 2021-04-20 RX ADMIN — ACETAMINOPHEN 650 MG: 325 TABLET ORAL at 11:51

## 2021-04-20 RX ADMIN — MAGNESIUM OXIDE TAB 400 MG (240 MG ELEMENTAL MG) 400 MG: 400 (240 MG) TAB at 09:49

## 2021-04-20 RX ADMIN — FERROUS SULFATE TAB 325 MG (65 MG ELEMENTAL FE) 325 MG: 325 (65 FE) TAB at 09:48

## 2021-04-20 RX ADMIN — GABAPENTIN 300 MG: 300 CAPSULE ORAL at 09:49

## 2021-04-20 RX ADMIN — DOXYCYCLINE 100 MG: 100 CAPSULE ORAL at 09:48

## 2021-04-20 RX ADMIN — INSULIN LISPRO 10 UNITS: 100 INJECTION, SOLUTION INTRAVENOUS; SUBCUTANEOUS at 07:44

## 2021-04-20 RX ADMIN — OXYCODONE 5 MG: 5 TABLET ORAL at 11:52

## 2021-04-20 RX ADMIN — INSULIN LISPRO 2 UNITS: 100 INJECTION, SOLUTION INTRAVENOUS; SUBCUTANEOUS at 07:44

## 2021-04-20 RX ADMIN — Medication 10 ML: at 09:50

## 2021-04-20 ASSESSMENT — PAIN DESCRIPTION - ORIENTATION
ORIENTATION: RIGHT

## 2021-04-20 ASSESSMENT — PAIN DESCRIPTION - PROGRESSION: CLINICAL_PROGRESSION: GRADUALLY WORSENING

## 2021-04-20 ASSESSMENT — PAIN SCALES - GENERAL
PAINLEVEL_OUTOF10: 0
PAINLEVEL_OUTOF10: 5
PAINLEVEL_OUTOF10: 7
PAINLEVEL_OUTOF10: 7
PAINLEVEL_OUTOF10: 6

## 2021-04-20 ASSESSMENT — PAIN DESCRIPTION - LOCATION
LOCATION: KNEE
LOCATION: KNEE

## 2021-04-20 ASSESSMENT — PAIN DESCRIPTION - ONSET
ONSET: ON-GOING
ONSET: ON-GOING

## 2021-04-20 ASSESSMENT — PAIN DESCRIPTION - FREQUENCY
FREQUENCY: CONTINUOUS

## 2021-04-20 ASSESSMENT — PAIN DESCRIPTION - DESCRIPTORS
DESCRIPTORS: ACHING;DISCOMFORT

## 2021-04-20 ASSESSMENT — PAIN - FUNCTIONAL ASSESSMENT
PAIN_FUNCTIONAL_ASSESSMENT: ACTIVITIES ARE NOT PREVENTED
PAIN_FUNCTIONAL_ASSESSMENT: ACTIVITIES ARE NOT PREVENTED

## 2021-04-20 ASSESSMENT — PAIN DESCRIPTION - PAIN TYPE: TYPE: SURGICAL PAIN

## 2021-04-20 NOTE — DISCHARGE SUMMARY
ACON DISCHARGE SUMMARY     Right total knee arthroplasty    The patient was taken to the operating room  where the aforementioned procedure was preformed. The patient was taken to the post operative anesthesia recovery unit in stable condition. The patient was then transferred to the orthopaedic floor for post operative pain management and convalesce       The patient was followed medically in the hospital for the above surgical procedures performed and below medical issues during their hospital stay    Active Problems:    Primary osteoarthritis of right knee    Edema of right lower leg due to venous stasis    Status post right knee replacement    Cellulitis of right leg  Resolved Problems:    * No resolved hospital problems. *         (x )The patient was placed on anticoagulation therapy for DVT prophylaxis       The patient was discharged in stable condition . Please see medical reconciliation for discharge medications. The discharge instructions were explained to the patient and the family. The patient will follow up in the office ~2 weeks postop for repeat examination and xray. DISCHARGE ORDER SET  [facility]  [phone]     ( x )  Post Discharge Instructions  For the first several weeks after surgery you will need to attend PT frequently as scheduled. Elevate extremity if swelling occurs  Continue the exercise program as prescribed by PT  Use walker, crutches or cane with weightbearing instructions as indicated   Do not ambulate without assistance until cleared to do so by PT  Use Spirometer every 2 hrs while awake     ( x ) Initiate bowel care with the following medications   Over-the-Counter Senokot  (or Over-the-Counter Colace (Docusate Sodium)  1-2 tabs by mouth twice daily, continue while on narcotics, hold for loose stools.      ( x  ) Physical Therapy Orders    Range-of-Motion, strengthening, gait training, ADL's.   May

## 2021-04-20 NOTE — PROGRESS NOTES
ID Follow-up NOTE    CC:   R leg cellulitis  Antibiotics: Doxycycline    Admit Date: 4/15/2021  Hospital Day: 6    Subjective:     Patient reports less R leg swelling and pain      Objective:     Patient Vitals for the past 8 hrs:   BP Temp Temp src Pulse Resp SpO2   21 1037 127/82 98.7 °F (37.1 °C) Oral 73 16 98 %   21 0704 106/67 97.7 °F (36.5 °C) Oral 68 16 92 %     I/O last 3 completed shifts: In: 65 [P.O.:840; I.V.:10]  Out: 950 [Urine:950]  I/O this shift:  In: 240 [P.O.:240]  Out: 450 [Urine:450]    EXAM:  GENERAL: No apparent distress.     HEENT: Membranes moist, no oral lesion  NECK:  Supple, no lymphadenopathy  LUNGS: Clear b/l, no rales, no dullness  CARDIAC: RRR, no murmur appreciated  ABD:  + BS, soft / NT  EXT:  No rash, no edema, no lesions  R knee wound with dressing, R LE edema, R ant tibial erythema less / improved   NEURO: No focal neurologic findings  PSYCH: Orientation, sensorium, mood normal  LINES:  Peripheral iv       Data Review:  Lab Results   Component Value Date    WBC 14.8 (H) 2021    HGB 10.7 (L) 2021    HCT 32.8 (L) 2021    MCV 87.5 2021     2021     Lab Results   Component Value Date    CREATININE 1.2 2021    BUN 21 (H) 2021     2021    K 4.7 2021     2021    CO2 23 2021       Hepatic Function Panel:   Lab Results   Component Value Date    ALKPHOS 77 04/15/2021    ALT 27 04/15/2021    AST 41 04/15/2021    PROT 7.0 04/15/2021    BILITOT 0.4 04/15/2021    BILIDIR <0.2 04/15/2021    IBILI see below 04/15/2021    LABALBU 3.7 04/15/2021       Micro:  None      Imagin/15 R knee - 'Status post total knee arthroplasty'       Scheduled Meds:   doxycycline monohydrate  100 mg Oral 2 times per day    celecoxib  100 mg Oral BID WC    gabapentin  300 mg Oral TID    insulin glargine  23 Units Subcutaneous Nightly    insulin lispro  10 Units Subcutaneous TID WC    Liraglutide  1.8 mg

## 2021-04-20 NOTE — PROGRESS NOTES
Patient a/o x4, patient endorses baseline numbness/tingling to BLE, Dressing to surgical site c/d/i. Redness noted to R shin. Patinet voiding adequately per urinal. Tolerating PO intake well, denies c/o nausea. Having c/o pain to R knee, well controlled w/current PRN interventions and ice. Call light, bedside table and personal belongings within reach.

## 2021-04-20 NOTE — CARE COORDINATION
ANKITA has been unable to reach Dr. Chinyere Maciel office. ANKITA placed call to [employer]. A messages was sent to Dr. Chinyere Maciel team regarding request to speak with social work so orders can be completed for discharge. ANKITA noted  Wound care had seen patient as well as ID. ANKITA placed message to Dr. Linda Perez at 11:10 am regarding discharge paperwork. As a result. ANKITA placed call to [facility] at  11:12 am. ANKITA asked to push transport to allow time for doctor to be reached and paperwork to be completed. Moved to 4:00 pm, but [facility] said to hair once paperwork is completed, and they can probably due the run earlier. ANKITA updated bedside RN. UPDATE: 11:57 am. Dr. Kathleen Cooper called. He will work on paperwork for discharge. ANKITA to follow.      JOLENE Robles, [place]  Social Work/Case Management  [facility]   [phone]

## 2021-04-20 NOTE — PLAN OF CARE
Problem: Pain:  Goal: Pain level will decrease  Description: Pain level will decrease. 4/20/2021 0752 by Hayde Timmons RN  Outcome: Ongoing    Problem: Falls - Risk of:  Goal: Will remain free from falls  Description: Will remain free from falls. Fall precautions in place. Bed is in lowest position, wheels locked, alarm on, non-skid socks on. Call light and bedside table within reach. Patient calls out appropriately. Patient is up x1 assist with a walker. Will continue to assess and monitor.    4/20/2021 0752 by Hayde Timmons RN  Outcome: Ongoing

## 2021-04-20 NOTE — PROGRESS NOTES
Physical Therapy  Daily Treatment Note    Discharge Recommendations: Sammi Kathleen scored a 16/24 on the AM-PAC short mobility form. Current research shows that an AM-PAC score of 17 or less is typically not associated with a discharge to the patient's home setting. Based on the patient's AM-PAC score and their current functional mobility deficits, it is recommended that the patient have 3-5 sessions per week of Physical Therapy at d/c to increase the patient's independence. Please see assessment section for further patient specific details. Assessment:  Pt needing occasional rest breaks due to fatigue. Mobility effortful. Needing fluctuating levels of assist for transfers. Up to Mod assist for R LE exercises. Pt continues to be below baseline for mobility at this time. Would benefit from continued IP PT at D/C to maximize strength and independence. Plan is for SNF. Equipment Needs: Defer to next level of care    Chart Reviewed: Yes     Other position/activity restrictions: Full weight bearing on operative leg; Knee immobilizer until full quadriceps function present   Additional Pertinent Hx: s/p right total knee replacement using robotic assistance (CARLOS Robot) on 4/15      Diagnosis: R knee OA   Treatment Diagnosis: impaired mobility s/p R TKR    Subjective: Pt in chair initially. Agreeable to working with PT. \"I'm ready to get back to bed. I've been sitting up morning. \"    Pain: 6-7/10 R knee. RN aware and has been addressing. Ice packs to R knee/LE after session. Objective:    Transfers  Sit to stand: Mod assist x 1 from recliner chair (1st trial); Min assist x 1 from commode with grab bars; CGA from std chair   Stand to sit: Min assist x 1 into chair, onto commode (with grab bar) and onto bed. Decreased eccentric control noted. Ambulation  Assistance Level: CGA  Assistive device: Bariatric wheeled walker  Distance: 20 ft, 25 ft, 5 ft, 8 ft. Seated rest between walks.    Quality of gait: Step-to pattern; moderate reliance on walker for support; exaggerated weight shifting; effortful    Bed mobility  Sit to Supine: Min assist for R LE, HOB flat    Exercises  10 reps B ankle pumps, quad sets, glut sets, R heel slides (mod assist), SAQ (mod assist), hip abd/add (min assist), SLR (mod assist)    ROM  10-52 degrees R knee    Balance  Sat EOB with SBA  Sat on commode with SBA  Static stance with walker CGA  Ambulation with wheeled walker CGA    Patient Education  Calling for assist with needs. Expressed understanding. Safety Devices  Pt left with needs in reach. In bed with bed alarm on. RN updated. AM-PAC score  AM-PAC Inpatient Mobility Raw Score : 16  AM-PAC Inpatient T-Scale Score : 40.78  Mobility Inpatient CMS 0-100% Score: 54.16  Mobility Inpatient CMS G-Code Modifier : CK    Goals: (as determined and assessed by primary PT)  Time Frame for Short term goals: dc  Short term goal 1: Transfers SBA. Ongoing   Short term goal 2: Ambulate [de-identified]' with RW SBA.  ongoing   Short term goal 3: up/down 4 curb steps with walker CGA- revised 4/16:  up/down 4 steps with B rails or 1 rail/AD CGA. Ongoing  Short term goal 4: R knee ROM 10-70. Ongoing  Short term goal 5: Demo independence with R TKA ex x 10 reps   Ongoing    Plan:  Times per week: 7  Current Treatment Recommendations: ROM, Strengthening, Balance Training, Functional Mobility Training, Transfer Training, Gait Training, Stair training, Safety Education & Training    Therapy Time    Individual  Concurrent  Group  Co-treatment    Time In  1116            Time Out  1154            Minutes  38              Timed Code Treatment Minutes: 38  Total Treatment Minutes: 38    Will continue per plan of care if not D/Samson to SNF today. If patient is discharged prior to next treatment, this note will serve as the discharge summary.     [place], [place] #[id number]

## 2021-04-20 NOTE — CARE COORDINATION
Case Management            Discharge Note                    Date / Time of Note: 4/20/2021 10:42 AM                  Discharge Note Completed by: Albino Junk Day    Patient Name: Maik Barrett   YOB: 1960  Diagnosis: Unilateral primary osteoarthritis, right knee [M17.11]  Primary osteoarthritis of right knee [M17.11]   Date / Time: 4/15/2021  9:09 AM    Current PCP: Kellee Holguin MD  Clinic patient: Yes    Hospitalization in the last 30 days: No    Advance Directives:  Code Status: Full Code  [place] DNR form completed and on chart: Not Indicated    Financial:  Payor: Aedn Ohs / Plan: [place] / Product Type: *No Product type* /      Pharmacy:    [address] [place] [address] [phone] Nadiamaame Gomeznati [phone]  [address] [facility] [address]  Phone: [phone] Fax: [phone]      Assistance purchasing medications?:    Assistance provided by Case Management: None at this time    Does patient want to participate in local refill/ meds to beds program?:      Meds To Beds General Rules:  1. Can ONLY be done Monday- Friday between 8:30am-5pm  2. Prescription(s) must be in pharmacy by 3pm to be filled same day  3. Copy of patient's insurance/ prescription drug card and patient face sheet must be sent along with the prescription(s)  4. Cost of Rx cannot be added to hospital bill. If financial assistance is needed, please contact unit  or ;  or  CANNOT provide pharmacy voucher for patients co-pays  5.  Patients can then  the prescription on their way out of the hospital at discharge, or pharmacy can deliver to the bedside if staff is available. (payment due at time of pick-up or delivery - cash, check, or card accepted)     Able to afford home medications/ co-pay costs:

## 2021-04-20 NOTE — PROGRESS NOTES
Patient left with belongings. IV's taken out. VSS. Discharge paperwork complete. SNF nurse received report.

## 2021-04-20 NOTE — PLAN OF CARE
Problem: Falls - Risk of:  Goal: Will remain free from falls  Description: Will remain free from falls  4/20/2021 0159 by Roosevelt Braxton RN  Outcome: Ongoing  Note: Hourly rounding on patient for needs. Non-skid socks on, bed in lowest position and locked. Bedside table, personal belongs, and nurse call light within reach. Instructed patient to use call light for assistance. Bed alarm on. Floor clear of clutter. Patient remains free of falls at this time. Problem: Pain:  Goal: Pain level will decrease  Description: Pain level will decrease  4/20/2021 0159 by Roosevelt Braxton RN  Outcome: Ongoing  Note: Patient having c/o pain to R knee. Educated on common side effects of narcotic pain medication, patient verbalized understanding. PRN medication administered, and upon reassessment patient found to be resting comfortably in bed w/eyes closed and respirations greater than 15/min. Problem: Skin Integrity:  Goal: Will show no infection signs and symptoms  Description: Will show no infection signs and symptoms  Outcome: Ongoing  Note: Unable to assess surgical site due to dressing, dressing is clean/dry/intact. No odor or drainage noted. Patient remains afebrile at this time.

## 2021-04-20 NOTE — PROGRESS NOTES
Patient chart reviewed, doing well, discussed with nursing, ID recommending 10 days of Doxycycline. Discussed with patient yesterday, he says he has good knowledge about his DM management, will continue pta dm meds on dc.  Ok to [employer] once precert obtained  Non-billable rounding

## 2021-04-20 NOTE — PROGRESS NOTES
Patient alert and oriented. VSS. Dressing status CDI. x1 to the bathroom with gait belt and walker. Tolerating fluids and diet well. Voiding adequately. Patient medicated per MAR.

## 2021-04-20 NOTE — PROGRESS NOTES
[facility] Wound Ostomy Continence Nurse  Follow-up Progress Note       NAME:  Bernie [facility] RECORD NUMBER:  [id number]  AGE:  64 y.o. GENDER:  male  :  1960  TODAY'S DATE:  2021    Subjective:   Wound Identification: L plantar foot, RLE to thigh  Wound Type: diabetic, surgical edema  Contributing Factors: DM, CAD, OA, R TKA 4/15/21      Pt. Sees podiatrist in [place] for foot care, silver alginate ordered daily. Patient Goal of Care:  [x] Wound Healing  [] Odor Control  [x] Palliative Care  [] Pain Control   [] Other:     Objective:    /67   Pulse 68   Temp 97.7 °F (36.5 °C) (Oral)   Resp 16   Ht 6' (1.829 m)   Wt 295 lb (133.8 kg)   SpO2 92%   BMI 40.01 kg/m²   Azeem Risk Score: Azeem Scale Score: 18  Assessment:   Measurements:  Wound 04/15/21 Left;Plantar Diabetic ulcer over Charcot joint (Active)   Wound Etiology Diabetic 21 1000   Dressing Status New dressing applied; Old drainage noted 21 1000   Wound Cleansed Cleansed with saline 21 1000   Dressing/Treatment Alginate with Ag; Foam 21 1000   Wound Length (cm) 0.7 cm 21 1000   Wound Width (cm) 0.7 cm 21 1000   Wound Depth (cm) 0.2 cm 21 1000   Wound Surface Area (cm^2) 0.49 cm^2 21 1000   Wound Volume (cm^3) 0.1 cm^3 21 1000   Wound Assessment Pink/red;Subcutaneous 21 1000   Drainage Amount Moderate 21 1000   Drainage Description Serosanguinous 21 1000   Odor None 21 1000   Teresa-wound Assessment Intact; Hyperpigmented 21 1000   Margins Defined edges; Unattached edges 21 1000   Wound Thickness Description not for Pressure Injury Full thickness 21 1000   Number of days: 5       Response to treatment: insensate- thankful for care  Plan:   Plan of Care: Wound 04/15/21 Left;Plantar Diabetic ulcer over Charcot joint-Dressing/Treatment: Alginate with Ag, Foam     Decrease edema to RLE, wound care as ordered regularly to L foot wound to manage strike through drainage. Current Diet: DIET CARB CONTROL;   Discharge Plan:  Placement for patient upon discharge: rehab unit in [address] [place] [address]  Patient appropriate for Outpatient Wound Care Center:no- sees podiatrist regularly    Patient/Caregiver Teaching:  Level of patient/caregiver understanding able to:   [x] Indicates understanding       [] Needs reinforcement  [] Unsuccessful      [x] Verbal Understanding  [] Demonstrated understanding       [] No evidence of learning  [] Refused teaching         [] N/A       Electronically signed by Erlinda Bello RN, Roosevelt Taylor on 4/20/2021 at 10:32 AM

## 2021-04-20 NOTE — PROGRESS NOTES
Occupational Therapy  Facility/Department: [facility] 5T ORTHO/NEURO  Daily Treatment Note  NAME: Linda Shearer  : 1960  MRN: [id number]    Date of Service: 2021    Discharge Recommendations:  Linda Shearer scored a 15/24 on the AM-PAC ADL Inpatient form. Current research shows that an AM-PAC score of 17 or less is typically not associated with a discharge to the patient's home setting. Based on the patient's AM-PAC score and their current ADL deficits, it is recommended that the patient have 3-5 sessions per week of Occupational Therapy at d/c to increase the patient's independence. Please see assessment section for further patient specific details. If patient discharges prior to next session this note will serve as a discharge summary. Please see below for the latest assessment towards goals. OT Equipment Recommendations  Other: defer to next setting    Assessment   Performance deficits / Impairments: Decreased functional mobility ; Decreased ADL status; Decreased endurance;Decreased balance  Assessment: Pt CGA - Min A for transfers this date and Min A for functional mobility. Pt fatigues quickly with activity and reports being anxious with ambulation causing reported dizziness, BP WNL. Pt wouold benefit from inpt therapy at d/c to maximize functional independence. Continue with POC. Treatment Diagnosis: impaired ADLs / functional transfers / decreased endurance  Prognosis: Fair;Good  OT Education: OT Role;Plan of Care;Transfer Training  Patient Education: reinforce as needed  REQUIRES OT FOLLOW UP: Yes  Activity Tolerance  Activity Tolerance: Patient limited by fatigue;Patient Tolerated treatment well  Activity Tolerance: Pt fatigues with activity  Safety Devices  Safety Devices in place: Yes  Type of devices: Left in chair;Chair alarm in place; All fall risk precautions in place; Patient at risk for falls;Call light within reach         Patient Diagnosis(es): The encounter diagnosis was Primary osteoarthritis of right knee. has a past medical history of Arthritis, Asthma, CAD (coronary artery disease), Depression, Diabetes mellitus ([address] GERD (gastroesophageal reflux disease), Hyperlipidemia, MI, old, Obesity, Urinary frequency, Urinary urgency, and Vitamin D deficiency. has a past surgical history that includes Cholecystectomy; angioplasty (sept 2015); hernia repair; Testicle removal (Right); other surgical history (N/A, 10/20/2015); other surgical history; Otilia-en-Y Gastric Bypass (11/19/2018); pr lap gastric bypass/otilia-en-y (N/A, 11/19/2018); Stimulator Surgery; and Total knee arthroplasty (Right, 4/15/2021). Restrictions  Position Activity Restriction  Other position/activity restrictions: Full weight bearing on operative leg; Knee immobilizer until full quadriceps function present  Subjective   General  Chart Reviewed: Yes  Additional Pertinent Hx: Admit to observation 4/15 s/p R TOTAL KNEE ARTHROPLASTY CARLOS                           PMHX: CAD, DM, HLD, Depression,Obesity, Gasric bypass 2018,  Response to previous treatment: Patient with no complaints from previous session  Family / Caregiver Present: No  Diagnosis: R TKA -Carlos on 4/15  Subjective  Subjective: Pt supine in bed upon entry and agreeable to therapy session. General Comment  Comments: Pt supine to sit SBA with HOB raised. Pt sit EOB Supervision. Pt shoes donned Mod A for right shoe. Pt sit to stand Mod A from EOB. Pt ambulated Min A ~15 ft to toilet. Pt toilet transfer Min A, pt with extended time to attempt toileting with pt requesting basin of warm water to put right hand in (pt still not successful for voiding). Pt CGA for balance for rear hygiene care in stance (no brief). Pt ambulated several ft to sink Min A. Pt in stance CGA for grooming (wash hands/oral care) ~< 5 min. Pt stand to sit CGA on BSC placed at sink. Pt washed UB Mod I. Pt washed thighs/darron area seated Mod I, CGA inn stance to wash buttocks.  Pt declined to wash feet. Pt sit to stand CGA from [facility] at sink pt ambulated ~4 steps then requested to sit to remove shoes. Pt stand to sit CGA. Pt doffed shoes. Pt sit to stand Min A and pt ambulated in room towards doorway. Pt became dizzy, started leaning on counter. Pt stand to sit in chair CGA. Pt /75. Pt commented \"I think I'm just anxious to get the walk over with. After seated rest break, pt sit to stand Min A, pt ambulated Min A ~10 ft to recliner. Pt stand to sit CGA. Call light in reach and chair alarm on. Pain Assessment  Pain Level: 7  Pain Type: Surgical pain  Pain Location: Knee  Pain Orientation: Right  Pain Descriptors: Aching  Pain Frequency: Continuous  Pre Treatment Pain Screening  Intervention List: Patient able to continue with treatment;Patient declined any intervention  Comments / Details: Pt reports already received pain meds from RN, ice packs applied EOS  Vital Signs  Patient Currently in Pain: Yes   Orientation  Orientation  Overall Orientation Status: Within Functional Limits  Objective    ADL  Grooming: Contact guard assistance(for balance)  UE Bathing: Modified independent   LE Bathing: Contact guard assistance(in stance for buttocks)  LE Dressing: (Mod A for shoes (assist right shoe) pt declined LB clothing)  Toileting: Contact guard assistance(for balance during rear hygiene care, not brief)        Balance  Sitting Balance: Supervision  Standing Balance: Minimal assistance(CGA/Min A)  Standing Balance  Time: ~12 min total  Activity: to/from bathroomm, ambulation in room, toilet transfer/toileting, in stance to groom, wash buttocks  Functional Mobility  Functional - Mobility Device: Rolling Walker  Activity: To/from bathroom; Other  Assist Level: Minimal assistance  Functional Mobility Comments: pt fatigues easily with acitivity  Toilet Transfers  Toilet - Technique: Ambulating  Equipment Used: Raised toilet seat with rails  Toilet Transfer: Minimal assistance  Bed mobility  Supine to Sit: Stand by assistance(effortful, HOB raised, use of bed rail)  Transfers  Sit to stand: Minimal assistance(CGA - Min A dependent on surface height)  Stand to sit: Contact guard assistance                       Cognition  Overall Cognitive Status: Exceptions  Safety Judgement: Decreased awareness of need for safety                                         Plan   Plan  Times per week: 7x  Times per day: Daily  Current Treatment Recommendations: Functional Mobility Training, Endurance Training, Safety Education & Training, Self-Care / ADL, Equipment Evaluation, Education, & procurement, Patient/Caregiver Education & Training  G-Code     OutComes Score                                                  AM-PAC Score        AM-PAC Inpatient Daily Activity Raw Score: 15 (04/20/21 1500)  AM-PAC Inpatient ADL T-Scale Score : 34.69 (04/20/21 1500)  ADL Inpatient CMS 0-100% Score: 56.46 (04/20/21 1500)  ADL Inpatient CMS G-Code Modifier : CK (04/20/21 1500)    Goals  Short term goals  Time Frame for Short term goals: at d/c  Short term goal 1: Stance x 6 mins with Supervision for ADLs - goal not met 4/19, 4/20  Short term goal 2: LE Dressing with CGA and AE prn - pt Dependent for shoes/socks 4/19 goal not met, Mod A for shoes goal not met 4/20  Short term goal 3: Commode transfers with SBA /RTS prn - goal not met Min A 4/19, 4/20  Patient Goals   Patient goals : Be independent       Therapy Time   Individual Concurrent Group Co-treatment   Time In [address]         Time Out 0843         Minutes 53         Timed Code Treatment Minutes: [address] [facility]

## 2021-08-09 ENCOUNTER — HOSPITAL ENCOUNTER (OUTPATIENT)
Dept: VASCULAR LAB | Age: 61
Discharge: HOME | End: 2021-08-09
Payer: MEDICARE

## 2021-08-09 DIAGNOSIS — I73.9 PERIPHERAL VASCULAR DISEASE, UNSPECIFIED (HCC): ICD-10-CM

## 2021-08-09 PROCEDURE — 93925 LOWER EXTREMITY STUDY: CPT

## 2021-08-12 ENCOUNTER — HOSPITAL ENCOUNTER (OUTPATIENT)
Dept: NUCLEAR MEDICINE | Age: 61
Discharge: HOME | End: 2021-08-12
Payer: MEDICARE

## 2021-08-12 DIAGNOSIS — M79.672 LEFT FOOT PAIN: ICD-10-CM

## 2021-08-12 PROCEDURE — 78102 BONE MARROW IMAGING LTD: CPT

## 2021-08-12 PROCEDURE — A9541 TC99M SULFUR COLLOID: HCPCS | Performed by: ORTHOPAEDIC SURGERY

## 2021-08-12 PROCEDURE — A9570 INDIUM IN-111 AUTO WBC: HCPCS | Performed by: ORTHOPAEDIC SURGERY

## 2021-08-12 PROCEDURE — 78300 BONE IMAGING LIMITED AREA: CPT

## 2021-08-12 PROCEDURE — 2580000003 HC RX 258: Performed by: ORTHOPAEDIC SURGERY

## 2021-08-12 PROCEDURE — 78800 RP LOCLZJ TUM 1 AREA 1 D IMG: CPT

## 2021-08-12 PROCEDURE — 3430000000 HC RX DIAGNOSTIC RADIOPHARMACEUTICAL: Performed by: ORTHOPAEDIC SURGERY

## 2021-08-12 RX ORDER — SODIUM CHLORIDE 0.9 % (FLUSH) 0.9 %
5-40 SYRINGE (ML) INJECTION PRN
Status: DISCONTINUED | OUTPATIENT
Start: 2021-08-12 | End: 2021-08-13 | Stop reason: HOSPADM

## 2021-08-12 RX ADMIN — Medication 10 ML: at 14:12

## 2021-08-12 RX ADMIN — Medication 16.9 MILLICURIE: at 12:00

## 2021-08-12 RX ADMIN — Medication 10 ML: at 13:00

## 2021-08-12 RX ADMIN — Medication 447 MICRO CURIE: at 13:00

## 2021-08-13 ENCOUNTER — HOSPITAL ENCOUNTER (OUTPATIENT)
Dept: NUCLEAR MEDICINE | Age: 61
Discharge: HOME | End: 2021-08-13
Payer: MEDICARE

## 2022-04-22 ENCOUNTER — HOSPITAL ENCOUNTER (OUTPATIENT)
Dept: WOUND CARE | Age: 62
Discharge: HOME | End: 2022-04-22
Payer: MEDICARE

## 2022-04-22 VITALS
WEIGHT: 315 LBS | RESPIRATION RATE: 16 BRPM | BODY MASS INDEX: 42.66 KG/M2 | SYSTOLIC BLOOD PRESSURE: 139 MMHG | HEART RATE: 76 BPM | DIASTOLIC BLOOD PRESSURE: 65 MMHG | HEIGHT: 72 IN | TEMPERATURE: 97 F

## 2022-04-22 DIAGNOSIS — L89.322 STAGE II PRESSURE ULCER OF LEFT BUTTOCK (HCC): Primary | ICD-10-CM

## 2022-04-22 DIAGNOSIS — I87.311 IDIOPATHIC CHRONIC VENOUS HYPERTENSION OF RIGHT LOWER EXTREMITY WITH ULCER (HCC): ICD-10-CM

## 2022-04-22 DIAGNOSIS — E66.01 MORBID OBESITY (HCC): ICD-10-CM

## 2022-04-22 DIAGNOSIS — L97.919 IDIOPATHIC CHRONIC VENOUS HYPERTENSION OF RIGHT LOWER EXTREMITY WITH ULCER (HCC): ICD-10-CM

## 2022-04-22 PROCEDURE — 99202 OFFICE O/P NEW SF 15 MIN: CPT | Performed by: SPECIALIST

## 2022-04-22 PROCEDURE — 99213 OFFICE O/P EST LOW 20 MIN: CPT

## 2022-04-22 PROCEDURE — 29581 APPL MULTLAYER CMPRN SYS LEG: CPT

## 2022-04-22 PROCEDURE — 6370000000 HC RX 637 (ALT 250 FOR IP): Performed by: SPECIALIST

## 2022-04-22 RX ORDER — LIDOCAINE 50 MG/G
OINTMENT TOPICAL ONCE
OUTPATIENT
Start: 2022-04-22 | End: 2022-04-22

## 2022-04-22 RX ORDER — GINSENG 100 MG
CAPSULE ORAL ONCE
OUTPATIENT
Start: 2022-04-22 | End: 2022-04-22

## 2022-04-22 RX ORDER — BACITRACIN ZINC AND POLYMYXIN B SULFATE 500; 1000 [USP'U]/G; [USP'U]/G
OINTMENT TOPICAL ONCE
OUTPATIENT
Start: 2022-04-22 | End: 2022-04-22

## 2022-04-22 RX ORDER — BACITRACIN, NEOMYCIN, POLYMYXIN B 400; 3.5; 5 [USP'U]/G; MG/G; [USP'U]/G
OINTMENT TOPICAL ONCE
OUTPATIENT
Start: 2022-04-22 | End: 2022-04-22

## 2022-04-22 RX ORDER — BETAMETHASONE DIPROPIONATE 0.05 %
OINTMENT (GRAM) TOPICAL ONCE
OUTPATIENT
Start: 2022-04-22 | End: 2022-04-22

## 2022-04-22 RX ORDER — LIDOCAINE 40 MG/G
CREAM TOPICAL ONCE
OUTPATIENT
Start: 2022-04-22 | End: 2022-04-22

## 2022-04-22 RX ORDER — CLOBETASOL PROPIONATE 0.5 MG/G
OINTMENT TOPICAL ONCE
OUTPATIENT
Start: 2022-04-22 | End: 2022-04-22

## 2022-04-22 RX ORDER — AMOXICILLIN AND CLAVULANATE POTASSIUM 875; 125 MG/1; MG/1
1 TABLET, FILM COATED ORAL 2 TIMES DAILY
COMMUNITY

## 2022-04-22 RX ORDER — LIDOCAINE HYDROCHLORIDE 40 MG/ML
SOLUTION TOPICAL ONCE
OUTPATIENT
Start: 2022-04-22 | End: 2022-04-22

## 2022-04-22 RX ORDER — LIDOCAINE HYDROCHLORIDE 40 MG/ML
2.5 SOLUTION TOPICAL ONCE
Status: COMPLETED | OUTPATIENT
Start: 2022-04-22 | End: 2022-04-22

## 2022-04-22 RX ORDER — LIDOCAINE HYDROCHLORIDE 20 MG/ML
JELLY TOPICAL ONCE
OUTPATIENT
Start: 2022-04-22 | End: 2022-04-22

## 2022-04-22 RX ORDER — GENTAMICIN SULFATE 1 MG/G
OINTMENT TOPICAL ONCE
OUTPATIENT
Start: 2022-04-22 | End: 2022-04-22

## 2022-04-22 RX ADMIN — LIDOCAINE HYDROCHLORIDE 2.5 ML: 40 SOLUTION TOPICAL at 08:43

## 2022-04-22 NOTE — PROGRESS NOTES
Multilayer Compression Wrap   (Not Unna) Below the Knee    NAME:  Johnathon Russell OF BIRTH:  1960  MEDICAL RECORD NUMBER:  [id number]  DATE:  4/22/2022        Removed old Multilayer wrap if present and washed leg with mild soap/water. 3    Applied moisturizing agent to dry skin as needed.   Applied primary and secondary dressing as ordered     Applied multilayered dressing below the knee to Right lower leg(s)  (2 Layer compression wrap ) .   Instructed patient/caregiver not to remove dressing and to keep it clean and dry.   Instructed patient/caregiver on complications to report to provider, such as pain, numbness in toes, heavy drainage, and slippage of dressing.   Instructed patient on purpose of compression dressing and on activity and exercise recommendations.     Applied per   Guidelines    Electronically signed by Honey Wyatt RN on 4/22/2022 at 9:41 AM

## 2022-04-22 NOTE — PROGRESS NOTES
[address] [facility]  Progress Note and Procedure Note      [id number] [facility] RECORD NUMBER:  [id number]  AGE: 63 y.o. GENDER: male  : 1960  EPISODE DATE:  2022      Subjective:     Chief Complaint   Patient presents with    Wound Check     initial         HISTORY of PRESENT ILLNESS HPI     Otis Serrano is a 63 y.o. male who presents today for wound evaluation. History of Wound Context: Patient referred from primary care physician for evaluation of right knee wound and left buttock wound. Patient states that he sustained trauma to the left anterior knee several weeks ago resulting in a wound. He does have a longstanding history of chronic venous insufficiency with significant bilateral lower extremity edema. Patient had right total knee replacement approximately 1 year ago. At present he is not wearing any compression stockings although he does have them in his possession. In addition there is a small wound that he noticed overlying the left buttocks that is nonpainful. Patient has a longstanding history of morbid obesity as well as diabetes.   He has undergone a gastric bypass surgery a little more than a year ago with excellent results to date lost to 120 pounds  Wound Pain Timing/Severity: none  Quality of pain: N/A  Severity:  0 / 10   Modifying Factors: None  Associated Signs/Symptoms: edema and numbness    Wound Identification:  Wound Type: venous and traumatic,pressure  Contributing Factors: edema, venous stasis, diabetes, decreased mobility and obesity    Acute Wound: N/A        PAST MEDICAL HISTORY        Diagnosis Date    Arthritis     Asthma     CAD (coronary artery disease)     Depression     Diabetes mellitus (HCC)     GERD (gastroesophageal reflux disease)     Hyperlipidemia     MI, old     Obesity     Urinary frequency     Urinary urgency     Vitamin D deficiency        PAST SURGICAL HISTORY    Past Surgical History:   Procedure Laterality Date    ANGIOPLASTY  2015    stent    CHOLECYSTECTOMY      HERNIA REPAIR      three    OTHER SURGICAL HISTORY N/A 10/20/2015    LAPAROSCOPIC SLEEVE GASTRECTOMY           OTHER SURGICAL HISTORY      interstim     bladder and bowel Right Back Hip    ME LAP GASTRIC BYPASS/HARJIT-EN-Y N/A 2018    LAPAROSCOPIC GASTRIC BYPASS WITH POSSIBLE HIATAL HERNIA REPAIR performed by Debo Mendoza DO at [address] [place] [address]  2018    LAPAROSCOPIC GASTRIC BYPASS WITH POSSIBLE HIATAL HERNIA    STIMULATOR SURGERY      TESTICLE REMOVAL Right     TOTAL KNEE ARTHROPLASTY Right 4/15/2021    RIGHT TOTAL KNEE ARTHROPLASTY CARLOS performed by Mundo Ross MD at [facility]    History reviewed. No pertinent family history. SOCIAL HISTORY    Social History     Tobacco Use    Smoking status: Former Smoker     Years: 10.00     Quit date: 1998     Years since quittin.4    Smokeless tobacco: Never Used   Vaping Use    Vaping Use: Never used   Substance Use Topics    Alcohol use: No    Drug use: No       ALLERGIES    No Known Allergies    MEDICATIONS    Current Outpatient Medications on File Prior to Encounter   Medication Sig Dispense Refill    amoxicillin-clavulanate (AUGMENTIN) 875-125 MG per tablet Take 1 tablet by mouth in the morning and at bedtime      insulin lispro, 1 Unit Dial, 100 UNIT/ML SOPN Inject 10 Units into the skin 3 times daily (with meals)      celecoxib (CELEBREX) 100 MG capsule Take 1 capsule by mouth 2 times daily (with meals) 60 capsule 3    aspirin 81 MG EC tablet Take 1 tablet by mouth 2 times daily 60 tablet 0    cyclobenzaprine (FLEXERIL) 10 MG tablet Take 1 tablet by mouth 3 times daily as needed for Muscle spasms 60 tablet 1    gabapentin (NEURONTIN) 300 MG capsule Take 300 mg by mouth 2 times daily.       albuterol sulfate  (90 Base) MCG/ACT inhaler Inhale 2 puffs into the lungs every 4 hours as needed (Patient not taking: Reported on 4/22/2022)      magnesium oxide (MAG-OX) 400 MG tablet magnesium 400 mg (as magnesium oxide) tablet   Take 1 tablet every day by oral route.  Liraglutide (VICTOZA) 18 MG/3ML SOPN SC injection Inject 1.8 mg into the skin daily      ipratropium-albuterol (DUONEB) 0.5-2.5 (3) MG/3ML SOLN nebulizer solution Inhale 3 mLs into the lungs every 6 hours as needed (Patient not taking: Reported on 4/22/2022)      ferrous sulfate (FE TABS 325) 325 (65 Fe) MG EC tablet Take 325 mg by mouth daily      cyanocobalamin 1000 MCG tablet Take 1,000 mcg by mouth daily      vitamin D3 (CHOLECALCIFEROL) 125 MCG (5000 UT) TABS tablet Take 10,000 Units by mouth daily      potassium chloride (KLOR-CON M) 20 MEQ extended release tablet Take 20 mEq by mouth daily      insulin lispro (HUMALOG) 100 UNIT/ML injection vial Inject into the skin 3 times daily as needed for High Blood Sugar      psyllium (KONSYL) 28.3 % PACK Take 1 packet by mouth daily (Patient not taking: Reported on 4/22/2022)      insulin glargine (TOUJEO SOLOSTAR) 300 UNIT/ML injection pen Inject 25 Units into the skin nightly 3 pen 3    lisinopril (PRINIVIL;ZESTRIL) 2.5 MG tablet Take 2.5 mg by mouth daily      metFORMIN (GLUCOPHAGE) 500 MG tablet Take 1,000 mg by mouth 2 times daily (with meals)       pantoprazole (PROTONIX) 40 MG tablet Take 40 mg by mouth daily      rosuvastatin (CRESTOR) 40 MG tablet Take 40 mg by mouth every evening      sertraline (ZOLOFT) 100 MG tablet Take 100 mg by mouth daily       No current facility-administered medications on file prior to encounter.        REVIEW OF SYSTEMS    Constitutional: negative for chills and fevers  Respiratory: negative for shortness of breath  Cardiovascular: negative except for lower extremity edema  Musculoskeletal:negative for muscle weakness      Last R4J if applicable:   Hemoglobin A1C   Date Value Ref Range Status   11/19/2018 6.5 See comment % Final     Comment: Comment:  Diagnosis of Diabetes: > or = 6.5%  Increased risk of diabetes (Prediabetes): 5.7-6.4%  Glycemic Control: Nonpregnant Adults: <7.0%                    Pregnant: <6.0%           Objective:      /65   Pulse 76   Temp 97 °F (36.1 °C) (Temporal)   Resp 16   Ht 6' (1.829 m)   Wt (!) 324 lb 1.2 oz (147 kg)   BMI 43.95 kg/m²     Wt Readings from Last 3 Encounters:   04/22/22 (!) 324 lb 1.2 oz (147 kg)   04/15/21 295 lb (133.8 kg)   11/19/18 296 lb (134.3 kg)       PHYSICAL EXAM    General Appearance: alert and oriented to person, place and time, well-developed and well-nourished, in no acute distress, alert and oriented to person, place and time and obese  Skin: Partial thickness granulating wound left buttocks designated as wound #1  Head: normocephalic and atraumatic  Pulmonary/Chest: clear to auscultation bilaterally- no wheezes, rales or rhonchi, normal air movement, no respiratory distress  Cardiovascular: normal rate, normal S1 and S2, no gallops and no carotid bruits  Abdomen: soft, non-tender, non-distended, normal bowel sounds, no masses or organomegaly  Extremities: no cyanosis, no clubbing, 2 + edema-  bilateral lower extremity edema with partial thickness granulating wound left anterior knee with brown pigmentation Charcot foot deformity present right lower extremity and marked decreased epicritic sensation bilateral feet,  Multiphasic waveforms seen throughout right lower extremity from arterial duplex study performed 7 months ago            Assessment:     Patient Active Problem List   Diagnosis    Morbid obesity ([facility] [address]    Primary osteoarthritis of right knee    Edema of right lower leg due to venous stasis    Status post right knee replacement    Cellulitis of right leg       Wound 04/15/21 Left;Plantar Diabetic ulcer over Charcot joint (Active)   Number of days: 372       Wound 04/22/22 Leg Anterior; Lower;Right #1 (Active)   Wound Image   04/22/22 0819   Wound Etiology Venous 04/22/22 0819   Dressing/Treatment Collagen 04/22/22 0941   Wound Length (cm) 1.5 cm 04/22/22 0819   Wound Width (cm) 1.5 cm 04/22/22 0819   Wound Depth (cm) 0.1 cm 04/22/22 0819   Wound Surface Area (cm^2) 2.25 cm^2 04/22/22 0819   Wound Volume (cm^3) 0.225 cm^3 04/22/22 0819   Post-Procedure Length (cm) 1.5 cm 04/22/22 0909   Post-Procedure Width (cm) 1.5 cm 04/22/22 0909   Post-Procedure Depth (cm) 0.1 cm 04/22/22 0909   Post-Procedure Surface Area (cm^2) 2.25 cm^2 04/22/22 0909   Post-Procedure Volume (cm^3) 0.225 cm^3 04/22/22 0909   Distance Tunneling (cm) 0 cm 04/22/22 0819   Undermining Maxium Distance (cm) 0 04/22/22 0819   Wound Assessment Pink/red 04/22/22 0819   Drainage Amount Small 04/22/22 0819   Drainage Description Serosanguinous 04/22/22 0819   Odor None 04/22/22 0819   Teresa-wound Assessment Edematous 04/22/22 0819   Margins Undefined edges 04/22/22 0819   Wound Thickness Description not for Pressure Injury Partial thickness 04/22/22 0819   Number of days: 0       Wound 04/22/22 Buttocks Left #2 (Active)   Wound Image   04/22/22 0819   Wound Etiology Pressure Stage  2 04/22/22 0819   Wound Cleansed Cleansed with saline 04/22/22 0819   Dressing/Treatment Calmoseptine/zinc oxide with menthol 04/22/22 0941   Wound Length (cm) 1 cm 04/22/22 0819   Wound Width (cm) 1 cm 04/22/22 0819   Wound Depth (cm) 0.1 cm 04/22/22 0819   Wound Surface Area (cm^2) 1 cm^2 04/22/22 0819   Wound Volume (cm^3) 0.1 cm^3 04/22/22 0819   Post-Procedure Length (cm) 1 cm 04/22/22 0909   Post-Procedure Width (cm) 1 cm 04/22/22 0909   Post-Procedure Depth (cm) 0.1 cm 04/22/22 0909   Post-Procedure Surface Area (cm^2) 1 cm^2 04/22/22 0909   Post-Procedure Volume (cm^3) 0.1 cm^3 04/22/22 0909   Distance Tunneling (cm) 0 cm 04/22/22 0819   Tunneling Position ___ O'Clock 0 04/22/22 0819   Undermining Starts ___ O'Clock 0 04/22/22 0819   Undermining Ends___ O'Clock 0 04/22/22 0819   Undermining Maxium Distance (cm) 0 04/22/22 2010 Wound Assessment Pink/red 04/22/22 0819   Drainage Amount None 04/22/22 0819   Odor None 04/22/22 0819   Teresa-wound Assessment Intact 04/22/22 0819   Margins Defined edges 04/22/22 0819   Wound Thickness Description not for Pressure Injury Partial thickness 04/22/22 0819   Number of days: 0     Incision 04/15/21 Knee Anterior;Right (Active)   Number of days: 371       Plan:       Treatment Note please see attached Discharge Instructions    New Medication(s) at this visit:   New Prescriptions    No medications on file       Other orders at this visit: No orders of the defined types were placed in this encounter. Weight Management: No. N/A    Smoking Cessation: Counseling given: Not Answered      Written Patient Dismissal Instructions Given       Discharge [facility] Physician Orders and Discharge Instructions  [address] [address] [address] [place] [address]. [place]  Telephone: [phone]  NAME:  Jacey Redmond OF BIRTH:  1960  MEDICAL RECORD NUMBER:  [id number]  DATE:  4/22/2022    Wash hands with soap and water prior to and after every dressing change. Wound Cleansing:   · Do not scrub or use excessive force. · With each dressing change, rinse wounds with 0.9% Saline. (May use wound wash or soft contact solution. Both can be purchased at a local drug store). · If unable to obtain saline, may use a gentle soap and water. · Keep wounds dry in the shower unless otherwise instructed by the physician. · For wounds on lower legs, cast covers can be purchased at local drug stores, so that you may shower and keep the wound(s) dry. []  Vashe Wash solution instructions (if prescribed): Apply enough Vashe to soak a piece of gauze and place on wound bed for 5-10 minutes. DO NOT rinse after Vashe has been applied. Follow dressing application as instructed below.     Teresa wound Topical Treatments:  Do not apply lotions, creams, or ointments to the skin around the wound bed unless directed as followed:     [] Apply around the wound: [] moisturizing lotion [] Antifungal ointment [] No-Sting barrier film [] Zinc paste [] Other:       Dressings:           Wound Location: right lower leg    Apply Primary Dressing to wound:       Collagen      Cover and Secure with:     Cover and Secure with: 4X4 gauze pad   Avoid contact of tape with skin if possible.  When to change Dressing: [] Daily [] Every Other Day [] Once a week  [] Three times per week: [] Monday, Wednesday, Friday [] Tuesday, Thursday, Saturday  [x] Do Not Change Dressing [] Other:    Dressings:           Wound Location: left buttocks       Apply Primary Dressing to wound: Other: thin layer of calmoseptine     Cover and Secure with:     Cover and Secure with: Other leave open to air   Avoid contact of tape with skin if possible.  When to change Dressing: [x] Daily [] Every Other Day [] Once a week  [] Three times per week: [] Monday, Wednesday, Friday [] Tuesday, Thursday, Saturday  [] Do Not Change Dressing [] Other: For Pressure Ulcer Relief:  · When sitting, shift position or do seat lifts every 15 minutes. · Turn every 2 hours when in bed. Avoid position directing pressure on wound site. · Limit side lying to 30 degree tilt. Limit HOB elevation to 30 degrees. Multilayer Compression Wrap:    Type: 2 Layer compression wrap   · Applied in Clinic to the :  Right lower leg(s) on 4/22/2022  · Do not get leg(s) with wrap wet. · If wraps become too tight call the center or completely remove the wrap. · Elevate leg(s) above the level of the heart when sitting. · Avoid prolonged standing in one place. Dietary:  Important dietary reminders:  1. Increase Protein intake (i.e. Lean meats, fish, eggs, legumes, and yogurt)  2. No added salt  3.  If diabetic, follow a diabetic diet and check glucose prior to meals or as instructed by your physician. Dietary Supplements:  [] Yogesh  [] 30ml ProStat  [] EnsureEnlive [] Ensure Max/Premier  [] Other:    If you are still having pain after you go home:   For wounds on lower legs or arms, elevate the affected limb.  Use over-the-counter medications you would normally use for pain as permitted by your primary care doctor.  For persistent pain not relieved by the above interventions, please call your primary care doctor. Return Appointment:   Return Appointment: With Dr. Selvin Garcia  in  [facility])    [] Return Appointment for a Wound Assessment (Nurse Visit) on:       [x] Orders placed during your visit: No orders of the defined types were placed in this encounter. o All other future appointments:  - No future appointments. Your nurse  is:  Bobby Castano     Electronically signed by Katie Ochoa RN on 4/22/2022 at [address] Information: Should you experience any significant changes in your wound(s) or have questions about your wound care, please contact the [address] [place] [address] at [phone]. We are open from 8:00am - 4:30p Monday, Thursday and Friday; 11:00am - 5pm on Tuesday and CLOSED Wednesday. Please give us 24-48 hours to return your call. Call your doctor now or seek immediate medical care if:    · You have symptoms of infection, such as:  ? Increased pain, swelling, warmth, or redness. ? Red streaks leading from the area. ? Pus draining from the area. ? A fever.         Physician for this visit and orders: Johnnie Laughlin MD    [] Patient unable to sign Discharge Instructions given to ECF/Transportation/POA        Electronically signed by Johnnie Laughlin MD on 4/22/2022 at 9:46 AM

## 2022-06-23 ENCOUNTER — TELEPHONE (OUTPATIENT)
Dept: WOUND CARE | Age: 62
End: 2022-06-23

## 2023-01-10 SDOH — HEALTH STABILITY: PHYSICAL HEALTH: ON AVERAGE, HOW MANY DAYS PER WEEK DO YOU ENGAGE IN MODERATE TO STRENUOUS EXERCISE (LIKE A BRISK WALK)?: 0 DAYS

## 2023-01-10 SDOH — HEALTH STABILITY: PHYSICAL HEALTH: ON AVERAGE, HOW MANY MINUTES DO YOU ENGAGE IN EXERCISE AT THIS LEVEL?: 0 MIN

## 2023-01-10 ASSESSMENT — SOCIAL DETERMINANTS OF HEALTH (SDOH)

## 2023-01-11 ENCOUNTER — OFFICE VISIT (OUTPATIENT)
Dept: ORTHOPEDIC SURGERY | Age: 63
End: 2023-01-11

## 2023-01-11 VITALS — BODY MASS INDEX: 42.66 KG/M2 | HEIGHT: 72 IN | WEIGHT: 315 LBS

## 2023-01-11 DIAGNOSIS — M25.512 LEFT SHOULDER PAIN, UNSPECIFIED CHRONICITY: ICD-10-CM

## 2023-01-11 DIAGNOSIS — M75.82 ROTATOR CUFF TENDINITIS, LEFT: Primary | ICD-10-CM

## 2023-01-11 NOTE — PROGRESS NOTES
Date:  2023    Name:  Elmer Ramsay  Address:  [address] [place] [id number]    :  1960      Age:   63 y.o.    SSN:  [id number]      Medical Record Number:  [id number]    Reason for Visit:    Chief Complaint    Shoulder Pain (New patient left shoulder )      DOS:2023     HPI: Carolina Vázquez is a 63 y.o. male here today for left shoulder pain for the past 3 months since 2022. 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 Since then he has had pain and weakness. Recently he underwent parathyroid surgery incision with post op incision healing well located anterior neck. ROS: All systems reviewed on patient intake form. Pertinent items are noted in HPI. Past Medical History:   Diagnosis Date    Arthritis     Asthma     CAD (coronary artery disease)     Depression     Diabetes mellitus ([address]     GERD (gastroesophageal reflux disease)     Hyperlipidemia     MI, old     Obesity     Urinary frequency     Urinary urgency     Vitamin D deficiency         Past Surgical History:   Procedure Laterality Date    ANGIOPLASTY  2015    stent    CHOLECYSTECTOMY      HERNIA REPAIR      three    OTHER SURGICAL HISTORY N/A 10/20/2015    LAPAROSCOPIC SLEEVE GASTRECTOMY           OTHER SURGICAL HISTORY      interstim     bladder and bowel Right Back Hip    SC LAP GASTRIC BYPASS/HARJIT-EN-Y N/A 2018    LAPAROSCOPIC GASTRIC BYPASS WITH POSSIBLE HIATAL HERNIA REPAIR performed by Ygnacio Mohs, DO at [facility]  2018    LAPAROSCOPIC GASTRIC BYPASS WITH POSSIBLE HIATAL HERNIA    STIMULATOR SURGERY      TESTICLE REMOVAL Right     TOTAL KNEE ARTHROPLASTY Right 4/15/2021    RIGHT TOTAL KNEE ARTHROPLASTY CARLOS performed by Yumi Torres MD at [facility] OR       No family history on file.     Social History     Socioeconomic History    Marital status:    Tobacco Use    Smoking status: Former     Years: 10.00     Types: Cigarettes     Quit date: 1998     Years since quittin.1    Smokeless tobacco: Never   Vaping Use    Vaping Use: Never used   Substance and Sexual Activity    Alcohol use: No    Drug use: No     Social Determinants of Health     Physical Activity: Inactive    Days of Exercise per Week: 0 days    Minutes of Exercise per Session: 0 min   Intimate Partner Violence: Not At Risk    Fear of Current or Ex-Partner: No    Emotionally Abused: No    Physically Abused: No    Sexually Abused: No       Current Outpatient Medications   Medication Sig Dispense Refill    amoxicillin-clavulanate (AUGMENTIN) 875-125 MG per tablet Take 1 tablet by mouth in the morning and at bedtime      insulin lispro, 1 Unit Dial, 100 UNIT/ML SOPN Inject 10 Units into the skin 3 times daily (with meals)      celecoxib (CELEBREX) 100 MG capsule Take 1 capsule by mouth 2 times daily (with meals) 60 capsule 3    aspirin 81 MG EC tablet Take 1 tablet by mouth 2 times daily 60 tablet 0    cyclobenzaprine (FLEXERIL) 10 MG tablet Take 1 tablet by mouth 3 times daily as needed for Muscle spasms 60 tablet 1    gabapentin (NEURONTIN) 300 MG capsule Take 300 mg by mouth 2 times daily. albuterol sulfate  (90 Base) MCG/ACT inhaler Inhale 2 puffs into the lungs every 4 hours as needed (Patient not taking: Reported on 2022)      magnesium oxide (MAG-OX) 400 MG tablet magnesium 400 mg (as magnesium oxide) tablet   Take 1 tablet every day by oral route.       Liraglutide (VICTOZA) 18 MG/3ML SOPN SC injection Inject 1.8 mg into the skin daily      ipratropium-albuterol (DUONEB) 0.5-2.5 (3) MG/3ML SOLN nebulizer solution Inhale 3 mLs into the lungs every 6 hours as needed (Patient not taking: Reported on 2022)      ferrous sulfate (FE TABS 325) 325 (65 Fe) MG EC tablet Take 325 mg by mouth daily      cyanocobalamin 1000 MCG tablet Take 1,000 mcg by mouth daily vitamin D3 (CHOLECALCIFEROL) 125 MCG (5000 UT) TABS tablet Take 10,000 Units by mouth daily      potassium chloride (KLOR-CON M) 20 MEQ extended release tablet Take 20 mEq by mouth daily      insulin lispro (HUMALOG) 100 UNIT/ML injection vial Inject into the skin 3 times daily as needed for High Blood Sugar      psyllium (KONSYL) 28.3 % PACK Take 1 packet by mouth daily (Patient not taking: Reported on 4/22/2022)      insulin glargine (TOUJEO SOLOSTAR) 300 UNIT/ML injection pen Inject 25 Units into the skin nightly 3 pen 3    lisinopril (PRINIVIL;ZESTRIL) 2.5 MG tablet Take 2.5 mg by mouth daily      metFORMIN (GLUCOPHAGE) 500 MG tablet Take 1,000 mg by mouth 2 times daily (with meals)       pantoprazole (PROTONIX) 40 MG tablet Take 40 mg by mouth daily      rosuvastatin (CRESTOR) 40 MG tablet Take 40 mg by mouth every evening      sertraline (ZOLOFT) 100 MG tablet Take 100 mg by mouth daily       No current facility-administered medications for this visit. No Known Allergies    Vital signs: There were no vitals taken for this visit. L shoulder exam    Inspection:  Held in a normal posture. Normal contour at the acromioclavicular joint. No swelling, ecchymosis, or erythema about the shoulder. No atrophy appreciated. No scapular winging. Palpation:  No subacromial crepitus. No tenderness of the AC joint. No greater tuberosity tenderness. No tenderness in the bicipital groove. Range of Motion: Full passive and active ROM. Normal scapulothoracic rhythm. Strength:  weak supraspinatus limited by pain, infraspinatus, and subscapularis muscle strength. Stability: No anterior instability. No posterior instability. Special Tests: Impingement findings are negative. Labral findings are negative. Speed sign and Yergason signs are both negative. Crossover sign is negative. Belly press sign is negative. Lift off sign is negative. Other findings: The skin is warm dry and well perfused.  2+ radial pulse. Sensation is intact to light touch over the deltoid. R comparison shoulder exam    Inspection:  Held in a normal posture. Normal contour at the acromioclavicular joint. No swelling, ecchymosis, or erythema about the shoulder. No atrophy appreciated. No scapular winging. Palpation:  No subacromial crepitus. No tenderness of the AC joint. No greater tuberosity tenderness. No tenderness in the bicipital groove. Range of Motion: Full passive and active ROM. Normal scapulothoracic rhythm. Strength:  Normal supraspinatus, infraspinatus, and subscapularis muscle strength. Stability: No anterior instability. No posterior instability. Special Tests: Impingement findings are negative. Labral findings are negative. Speed sign and Yergason signs are both negative. Crossover sign is negative. Belly press sign is negative. Lift off sign is negative. Other findings: The skin is warm dry and well perfused. 2+ radial pulse. Sensation is intact to light touch over the deltoid. Diagnostics:  Radiology:       Mild glenohumeral arthritis no fracture or dislocation    Assessment: Left shoulder rotator cuff tendinitis mild glenohumeral arthritis    Plan: Pertinent imaging was reviewed. The etiology, natural history, and treatment options for the disorder were discussed. The roles of activity medication, antiinflammatories, injections, bracing, physical therapy, and surgical interventions were all described to the patient and questions were answered. Procedure: The indications and risks of steroid injection as well as treatment alternatives were discussed with the patient who consented to the procedure. Under sterile conditions and after informed consent was obtained, using posterior approach the patient was given an injection into the left shoulder split equally between subacromial space and glenohumeral joint.  2mL 40 mg of Depo-Medrol  and 4 mL of 0.5% ropivacaine (Naropin) were placed in the shoulder after it was prepped with chlorhexidine.  This resulted in good relief of symptoms.  There were no complications. The patient was advised to ice the shoulder this evening and to avoid vigorous activities for the next 2 days.  They were advised to call us if there was any erythema, enduration, swelling or increasing pain.   . Solitario Morrison is in agreement with this plan. All questions were answered to patient's satisfaction and was encouraged to call with any further questions.    Formal PT provided.    Follow up in 1 month     The patient was advised that NSAID-type medications have several potential side effects that include: gastrointestinal irritation including hemorrhage, renal injury, as well as an increased risk for heart attack and stroke. The patient was asked to take the medication with food and to stop if there is any symptoms of GI upset, including heartburn, nausea, increased gas or diarrhea. I asked the patient to contact their medical provider for vomiting, abdominal pain or black/bloody stools. The patient should have renal function testing per his medical provider periodically if the medication is taken on a regular basis.  The patient should be alert for any swelling in the lower extremities and should stop taking the medication immediately and contact their medical provider should this occur.  In addition, the patient should stop taking the medication immediately and contact their medical provider should there be any shortness of breath, fatigue and be evaluated in an emergency facility for any chest pain.  The patient expresses understanding of these issues and questions were answered.      Total time spent for evaluation, education, and development of treatment plan: 48 minutes.      Tanya Mar MD  [facility] Clinical [facility]  1/11/2023    Orders Placed This Encounter   Procedures    XR SHOULDER LEFT (MIN 2 VIEWS)     Standing Status:   Future     Number of Occurrences:   1      Standing Expiration Date:   1/11/2024     Order Specific Question:   Reason for exam:     Answer:   pain        I attest that I met personally with the patient, performed the described exam, reviewed the radiographic studies and medical records associated with this patient and supervised the services that are described above.      Jaymie Burnett MD

## 2023-02-21 ENCOUNTER — OFFICE VISIT (OUTPATIENT)
Dept: ORTHOPEDIC SURGERY | Age: 63
End: 2023-02-21
Payer: COMMERCIAL

## 2023-02-21 VITALS — HEIGHT: 72 IN | BODY MASS INDEX: 42.66 KG/M2 | WEIGHT: 315 LBS

## 2023-02-21 DIAGNOSIS — M19.019 GLENOHUMERAL ARTHRITIS: ICD-10-CM

## 2023-02-21 DIAGNOSIS — M25.512 LEFT SHOULDER PAIN, UNSPECIFIED CHRONICITY: ICD-10-CM

## 2023-02-21 DIAGNOSIS — M75.82 ROTATOR CUFF TENDINITIS, LEFT: Primary | ICD-10-CM

## 2023-02-21 PROCEDURE — 99213 OFFICE O/P EST LOW 20 MIN: CPT | Performed by: ORTHOPAEDIC SURGERY

## 2023-02-21 NOTE — PROGRESS NOTES
Date:  2023    Name:  Lidia Kocher  Address:  [address] [place] [id number]    :  1960      Age:   63 y.o.    SSN:        Medical Record Number:  [id number]    Reason for Visit:    Chief Complaint    Follow-up (LEFT SHOULDER RTC TENDINITIS)      DOS:2023     HPI: Thony Tai is a 63 y.o. male here today for a follow up evaluation of his left shoulder. He previously reported that his pain had been ongoing for 3 months with no know injury. 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 He underwent an intraarticular steroid injection on 2023 and referred to physical therapy. Patient comes in today with an exacerbation of symptoms and expressed that he has not seen any improvement with the therapy or injection. He denies any new injury. Pain Assessment  Location of Pain: Shoulder  Location Modifiers: Right  Severity of Pain: 4  Quality of Pain: Aching  Duration of Pain:  (N/A)  Frequency of Pain: Intermittent  Aggravating Factors: Other (Comment) (PUTTING ARM BEHIND THE BACK)  Limiting Behavior: Yes  Relieving Factors: Rest, Ice, Other (Comment) (ELEVATION)  Result of Injury: No  Work-Related Injury: No  Are there other pain locations you wish to document?: No  ROS: All systems reviewed on patient intake form. Pertinent items are noted in HPI.         Past Medical History:   Diagnosis Date    Arthritis     Asthma     CAD (coronary artery disease)     Depression     Diabetes mellitus (HCC)     GERD (gastroesophageal reflux disease)     Hyperlipidemia     MI, old     Obesity     Urinary frequency     Urinary urgency     Vitamin D deficiency         Past Surgical History:   Procedure Laterality Date    ANGIOPLASTY  2015    stent    CHOLECYSTECTOMY      HERNIA REPAIR      three    OTHER SURGICAL HISTORY N/A 10/20/2015    LAPAROSCOPIC SLEEVE GASTRECTOMY           OTHER SURGICAL HISTORY      interstim     bladder and bowel Right Back Hip    MN LAPS GSTR RSTCV PX W/BYP HARJIT-EN-Y LIMB <150 CM N/A 2018    LAPAROSCOPIC GASTRIC BYPASS WITH POSSIBLE HIATAL HERNIA REPAIR performed by Rupert Trinidad DO at [facility]  2018    LAPAROSCOPIC GASTRIC BYPASS WITH POSSIBLE HIATAL HERNIA    STIMULATOR SURGERY      TESTICLE REMOVAL Right     TOTAL KNEE ARTHROPLASTY Right 4/15/2021    RIGHT TOTAL KNEE ARTHROPLASTY CARLOS performed by Brady Ryan MD at [facility] OR       No family history on file.     Social History     Socioeconomic History    Marital status:      Spouse name: None    Number of children: None    Years of education: None    Highest education level: None   Tobacco Use    Smoking status: Former     Years: 10.00     Types: Cigarettes     Quit date: 1998     Years since quittin.2    Smokeless tobacco: Never   Vaping Use    Vaping Use: Never used   Substance and Sexual Activity    Alcohol use: No    Drug use: No     Social Determinants of Health     Physical Activity: Inactive    Days of Exercise per Week: 0 days    Minutes of Exercise per Session: 0 min   Intimate Partner Violence: Not At Risk    Fear of Current or Ex-Partner: No    Emotionally Abused: No    Physically Abused: No    Sexually Abused: No       Current Outpatient Medications   Medication Sig Dispense Refill    amoxicillin-clavulanate (AUGMENTIN) 875-125 MG per tablet Take 1 tablet by mouth in the morning and at bedtime      insulin lispro, 1 Unit Dial, 100 UNIT/ML SOPN Inject 10 Units into the skin 3 times daily (with meals)      celecoxib (CELEBREX) 100 MG capsule Take 1 capsule by mouth 2 times daily (with meals) 60 capsule 3    aspirin 81 MG EC tablet Take 1 tablet by mouth 2 times daily 60 tablet 0    cyclobenzaprine (FLEXERIL) 10 MG tablet Take 1 tablet by mouth 3 times daily as needed for Muscle spasms 60 tablet 1    gabapentin (NEURONTIN) 300 MG capsule Take 300 mg by mouth 2 times daily. albuterol sulfate  (90 Base) MCG/ACT inhaler Inhale 2 puffs into the lungs every 4 hours as needed (Patient not taking: Reported on 4/22/2022)      magnesium oxide (MAG-OX) 400 MG tablet magnesium 400 mg (as magnesium oxide) tablet   Take 1 tablet every day by oral route. Liraglutide (VICTOZA) 18 MG/3ML SOPN SC injection Inject 1.8 mg into the skin daily      ipratropium-albuterol (DUONEB) 0.5-2.5 (3) MG/3ML SOLN nebulizer solution Inhale 3 mLs into the lungs every 6 hours as needed (Patient not taking: Reported on 4/22/2022)      ferrous sulfate (FE TABS 325) 325 (65 Fe) MG EC tablet Take 325 mg by mouth daily      cyanocobalamin 1000 MCG tablet Take 1,000 mcg by mouth daily      vitamin D3 (CHOLECALCIFEROL) 125 MCG (5000 UT) TABS tablet Take 10,000 Units by mouth daily      potassium chloride (KLOR-CON M) 20 MEQ extended release tablet Take 20 mEq by mouth daily      insulin lispro (HUMALOG) 100 UNIT/ML injection vial Inject into the skin 3 times daily as needed for High Blood Sugar      psyllium (KONSYL) 28.3 % PACK Take 1 packet by mouth daily (Patient not taking: Reported on 4/22/2022)      insulin glargine (TOUJEO SOLOSTAR) 300 UNIT/ML injection pen Inject 25 Units into the skin nightly 3 pen 3    lisinopril (PRINIVIL;ZESTRIL) 2.5 MG tablet Take 2.5 mg by mouth daily      metFORMIN (GLUCOPHAGE) 500 MG tablet Take 1,000 mg by mouth 2 times daily (with meals)       pantoprazole (PROTONIX) 40 MG tablet Take 40 mg by mouth daily      rosuvastatin (CRESTOR) 40 MG tablet Take 40 mg by mouth every evening      sertraline (ZOLOFT) 100 MG tablet Take 100 mg by mouth daily       No current facility-administered medications for this visit. No Known Allergies    Vital signs:  Ht 6' (1.829 m)   Wt (!) 315 lb (142.9 kg)   BMI 42.72 kg/m²        Neuro: Alert & oriented x 3,  normal,  no focal deficits noted. Normal affect.   Eyes: sclera clear  Ears: Normal external ear  Mouth:  No perioral lesions  Pulm: Respirations unlabored and regular  Pulse: Regular rate    Skin: Warm, well perfused      Left  Shoulder Examination:    Inspection:      No abnormal swelling. No erythema. No induration. Palpation:     Tenderness of the greater tuberosity. Acromioclavicular joint:      Nontender to palpation. Range of Motion:      80, 80, 80, 0     Strength:      moderate supraspinatus muscle weakness secondary to pain. Instability:       No anterior or posterior subluxation. Additional Tests:      Positive impingement findings. Vascular:       Skin warm well perfused. Neurologic:     Sensation intact to light touch. Diagnostics:  Radiology:     No new imaging obtained in the office today       Assessment: 63 y.o male with glenohumeral osteoarthritis and rotator cuff tendinitis, left shoulder. Plan: Pertinent imaging was reviewed. The etiology, natural history, and treatment options for the disorder were discussed. The roles of activity medication, antiinflammatories, injections, bracing, physical therapy, and surgical interventions were all described to the patient and questions were answered. Patient has rotator cuff tendinitis and moderate glenohumeral osteoarthritis. He has not had any improvement with the injection or physical therapy. He comes in with an exacerbation of symptoms. Because he has not gotten any better we believe patient is a candidate for a mri to evaluate for a rotator cuff tear. We will coordinate this with him and see him back for the results. For now, we recommend that he discontinue with therapy until after results. Bassam Bran is in agreement with this plan. All questions were answered to patient's satisfaction and was encouraged to call with any further questions.            Orders Placed This Encounter   Procedures    MRI SHOULDER LEFT WO CONTRAST     Standing Status:   Future Standing Expiration Date:   2/21/2024     Order Specific Question:   Reason for exam:     Answer:   MRI LEFT SHOULDER W/3D EVAL RCT     Order Specific Question:   Reason for exam:     Answer:   Andrey Soulier TO [facility] PACS     Order Specific Question:   What is the sedation requirement? Answer:   None     Total time spent for evaluation, education, and development of treatment plan: 25 minutes   I Johnny Carpio CMA, am scribing for and in the presence of Dr. Gaetano Rosado MD.    2/21/23 11:25 AM  Johnny Carpio CMA        I attest that I met personally with the patient, performed the described exam, reviewed the radiographic studies and medical records associated with this patient and supervised the services that are described above.      Janey Rudd MD

## 2023-02-22 ENCOUNTER — TELEPHONE (OUTPATIENT)
Dept: ORTHOPEDIC SURGERY | Age: 63
End: 2023-02-22

## 2023-03-09 ENCOUNTER — HOSPITAL ENCOUNTER (OUTPATIENT)
Dept: CT IMAGING | Age: 63
Discharge: HOME | End: 2023-03-09
Payer: MEDICARE

## 2023-03-09 ENCOUNTER — HOSPITAL ENCOUNTER (OUTPATIENT)
Dept: GENERAL RADIOLOGY | Age: 63
Discharge: HOME | End: 2023-03-09
Payer: MEDICARE

## 2023-03-09 DIAGNOSIS — M25.512 LEFT SHOULDER PAIN, UNSPECIFIED CHRONICITY: ICD-10-CM

## 2023-03-09 PROCEDURE — 20610 DRAIN/INJ JOINT/BURSA W/O US: CPT

## 2023-03-09 PROCEDURE — 6360000004 HC RX CONTRAST MEDICATION: Performed by: ORTHOPAEDIC SURGERY

## 2023-03-09 PROCEDURE — 2500000003 HC RX 250 WO HCPCS: Performed by: ORTHOPAEDIC SURGERY

## 2023-03-09 RX ORDER — LIDOCAINE HYDROCHLORIDE 10 MG/ML
5 INJECTION, SOLUTION EPIDURAL; INFILTRATION; INTRACAUDAL; PERINEURAL ONCE
Status: DISCONTINUED | OUTPATIENT
Start: 2023-03-09 | End: 2023-03-09 | Stop reason: ALTCHOICE

## 2023-03-09 RX ADMIN — LIDOCAINE HYDROCHLORIDE ANHYDROUS 5 ML: 10 INJECTION, SOLUTION INFILTRATION at 15:23

## 2023-03-09 RX ADMIN — IOPAMIDOL 50 ML: 612 INJECTION, SOLUTION INTRAVENOUS at 15:22

## 2023-03-09 RX ADMIN — LIDOCAINE HYDROCHLORIDE ANHYDROUS 5 ML: 10 INJECTION, SOLUTION INFILTRATION at 15:31

## 2023-03-10 ENCOUNTER — OFFICE VISIT (OUTPATIENT)
Dept: ORTHOPEDIC SURGERY | Age: 63
End: 2023-03-10
Payer: MEDICARE

## 2023-03-10 ENCOUNTER — HOSPITAL ENCOUNTER (OUTPATIENT)
Dept: CT IMAGING | Age: 63
Discharge: HOME | End: 2023-03-10
Payer: MEDICARE

## 2023-03-10 VITALS — WEIGHT: 302 LBS | HEIGHT: 72 IN | BODY MASS INDEX: 40.9 KG/M2

## 2023-03-10 DIAGNOSIS — M19.019 GLENOHUMERAL ARTHRITIS: ICD-10-CM

## 2023-03-10 DIAGNOSIS — M75.82 ROTATOR CUFF TENDINITIS, LEFT: Primary | ICD-10-CM

## 2023-03-10 PROCEDURE — 99214 OFFICE O/P EST MOD 30 MIN: CPT | Performed by: ORTHOPAEDIC SURGERY

## 2023-03-10 PROCEDURE — 73201 CT UPPER EXTREMITY W/DYE: CPT

## 2023-03-10 NOTE — PROGRESS NOTES
Chief Complaint    Follow-up (Left shoulder. Ct scan )      History of Present Illness:  Diane Mart is a 63 y.o. male here today for follow up evaluation of the left shoulder. He is here to review CT results. He previously reported that his pain had been ongoing for 3 months with no know injury. 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 He underwent an intraarticular steroid injection on 01/11/2023, is taking Celebrex and referred to physical therapy. Patient comes in today with an exacerbation of symptoms and expressed that he has not seen any improvement with the therapy or injection. He denies any new injury. Medical History:  Patient's medications, allergies, past medical, surgical, social and family histories were reviewed and updated as appropriate. Pertinent items are noted in HPI  Review of systems reviewed from Patient History Form completed today and available in the patient's chart under the Media tab.        Pain Assessment  Location of Pain: Shoulder  Location Modifiers: Left  Severity of Pain: 4  Quality of Pain: Aching  Duration of Pain: Persistent  Frequency of Pain: Constant  Aggravating Factors:  (movement)  Limiting Behavior: Yes  Relieving Factors: Rest  Result of Injury: No  Work-Related Injury: No  Are there other pain locations you wish to document?: No    Past Medical History:   Diagnosis Date    Arthritis     Asthma     CAD (coronary artery disease)     Depression     Diabetes mellitus (HCC)     GERD (gastroesophageal reflux disease)     Hyperlipidemia     MI, old 2010    Obesity     Urinary frequency     Urinary urgency     Vitamin D deficiency         Past Surgical History:   Procedure Laterality Date    ANGIOPLASTY  sept 2015    stent    CHOLECYSTECTOMY      HERNIA REPAIR      three    OTHER SURGICAL HISTORY N/A 10/20/2015    LAPAROSCOPIC SLEEVE GASTRECTOMY OTHER SURGICAL HISTORY      interstim     bladder and bowel Right Back Hip    [place] LAPS GSTR RSTCV PX W/BYP HARJIT-EN-Y LIMB <150 CM N/A 2018    LAPAROSCOPIC GASTRIC BYPASS WITH POSSIBLE HIATAL HERNIA REPAIR performed by Annie Knutson DO at [address] [place]  2018    LAPAROSCOPIC GASTRIC BYPASS WITH POSSIBLE HIATAL HERNIA    STIMULATOR SURGERY      TESTICLE REMOVAL Right     TOTAL KNEE ARTHROPLASTY Right 4/15/2021    RIGHT TOTAL KNEE ARTHROPLASTY CARLOS performed by Johnathan Sanchez MD at [facility]. No pertinent family history.     Social History     Socioeconomic History    Marital status:      Spouse name: None    Number of children: None    Years of education: None    Highest education level: None   Tobacco Use    Smoking status: Former     Years: 10.00     Types: Cigarettes     Quit date: 1998     Years since quittin.3    Smokeless tobacco: Never   Vaping Use    Vaping Use: Never used   Substance and Sexual Activity    Alcohol use: No    Drug use: No     Social Determinants of Health     Physical Activity: Inactive    Days of Exercise per Week: 0 days    Minutes of Exercise per Session: 0 min   Intimate Partner Violence: Not At Risk    Fear of Current or Ex-Partner: No    Emotionally Abused: No    Physically Abused: No    Sexually Abused: No       Current Outpatient Medications   Medication Sig Dispense Refill    amoxicillin-clavulanate (AUGMENTIN) 875-125 MG per tablet Take 1 tablet by mouth in the morning and at bedtime      insulin lispro, 1 Unit Dial, 100 UNIT/ML SOPN Inject 10 Units into the skin 3 times daily (with meals)      celecoxib (CELEBREX) 100 MG capsule Take 1 capsule by mouth 2 times daily (with meals) 60 capsule 3    aspirin 81 MG EC tablet Take 1 tablet by mouth 2 times daily 60 tablet 0    cyclobenzaprine (FLEXERIL) 10 MG tablet Take 1 tablet by mouth 3 times daily as needed for Muscle spasms 60 tablet 1    gabapentin (NEURONTIN) 300 MG capsule Take 300 mg by mouth 2 times daily. albuterol sulfate  (90 Base) MCG/ACT inhaler Inhale 2 puffs into the lungs every 4 hours as needed (Patient not taking: Reported on 4/22/2022)      magnesium oxide (MAG-OX) 400 MG tablet magnesium 400 mg (as magnesium oxide) tablet   Take 1 tablet every day by oral route. Liraglutide (VICTOZA) 18 MG/3ML SOPN SC injection Inject 1.8 mg into the skin daily      ipratropium-albuterol (DUONEB) 0.5-2.5 (3) MG/3ML SOLN nebulizer solution Inhale 3 mLs into the lungs every 6 hours as needed (Patient not taking: Reported on 4/22/2022)      ferrous sulfate (FE TABS 325) 325 (65 Fe) MG EC tablet Take 325 mg by mouth daily      cyanocobalamin 1000 MCG tablet Take 1,000 mcg by mouth daily      vitamin D3 (CHOLECALCIFEROL) 125 MCG (5000 UT) TABS tablet Take 10,000 Units by mouth daily      potassium chloride (KLOR-CON M) 20 MEQ extended release tablet Take 20 mEq by mouth daily      insulin lispro (HUMALOG) 100 UNIT/ML injection vial Inject into the skin 3 times daily as needed for High Blood Sugar      psyllium (KONSYL) 28.3 % PACK Take 1 packet by mouth daily (Patient not taking: Reported on 4/22/2022)      insulin glargine (TOUJEO SOLOSTAR) 300 UNIT/ML injection pen Inject 25 Units into the skin nightly 3 pen 3    lisinopril (PRINIVIL;ZESTRIL) 2.5 MG tablet Take 2.5 mg by mouth daily      metFORMIN (GLUCOPHAGE) 500 MG tablet Take 1,000 mg by mouth 2 times daily (with meals)       pantoprazole (PROTONIX) 40 MG tablet Take 40 mg by mouth daily      rosuvastatin (CRESTOR) 40 MG tablet Take 40 mg by mouth every evening      sertraline (ZOLOFT) 100 MG tablet Take 100 mg by mouth daily       No current facility-administered medications for this visit. No Known Allergies    Vital signs:  Ht 6' (1.829 m)   Wt (!) 302 lb (137 kg)   BMI 40.96 kg/m²          LEFT Shoulder Examination:    Inspection:  Held in a normal posture.  Normal contour at the acromioclavicular joint. No abnormal swelling, ecchymosis or erythema about the shoulder.  No induration. No atrophy appreciated.     Palpation: Tenderness of the greater tuberosity.      Acromioclavicular joint: Nontender to palpation.    Range of Motion: Normal scapulothoracic rhythm. Limitation of internal rotation.     Strength: Normal infraspinatus and subscapularis muscle strength. Mild supraspinatus muscle weakness secondary to pain.    Instability: No anterior or posterior subluxation/instability.    Additional Tests: Crossover sign is negative. Belly press sign is negative. Lift off sign is negative. Labral findings are negative. Speed sign and Yergason signs are both negative. Positive impingement findings.    Vascular:       Skin warm well perfused.      Neurologic:     Sensation intact to light touch.      Comparison RIGHT Shoulder Examination:    Inspection:  Held in a normal posture. Normal contour at the acromioclavicular joint. No swelling, ecchymosis, or erythema about the shoulder. No atrophy appreciated.    Palpation:  No subacromial crepitus.    Range of Motion: Full passive and active ROM. Normal scapulothoracic rhythm.    Strength:  Normal supraspinatus, infraspinatus, and subscapularis muscle strength.    Stability: No anterior instability. No posterior instability.    Special Tests:  Crossover sign is negative. Belly press sign is negative. Lift off sign is negative. Impingement findings are negative. Labral findings are negative. Speed sign and Yergason signs are both negative.    Vascular: The skin is warm dry and well perfused.     Neurologic: Distally neurovascularly intact. Sensation is intact to light touch over the deltoid.            Radiology:     Pertinent imaging was interpreted and reviewed with the patient, both images and report.     CT of left shoulder dated 2/27/2023 was interpreted and reviewed with the patient today.  Impression   1. No rotator cuff tendon tear identified.  2. Mild joint space narrowing and early osteophyte formation of the glenohumeral joint compatible with osteoarthritis. 3. Incidental coronary artery calcification. Assessment :  63year old male with rotator cuff tendinitis of left shoulder with moderate glenohumeral arthritis    Impression:  Encounter Diagnoses   Name Primary? Rotator cuff tendinitis, left Yes    Glenohumeral arthritis        Office Procedures:  No orders of the defined types were placed in this encounter. Plan: Pertinent imaging was reviewed. The etiology, natural history, and treatment options for the disorder were discussed. The roles of activity medication, antiinflammatories, injections, bracing, physical therapy, and surgical interventions were all described to the patient and questions were answered. Patient's CT does not show a full thickness rotator cuff tear. He has had minimal improvement from physical therapy, injection, or anti-inflammatories. We discussed that no improvement with the injection makes an arthroscopy very unpredictable. I recommend he continue flexibility and strengthening exercises at home. It is too early for a corticosteroid injection at this time but if symptoms persist or worsen we can repeat in a month. I will see him back if symptoms persist or worsen. Elmira Joseph is in agreement with this plan. All questions were answered to patient's satisfaction and was encouraged to call with any further questions. Total time spent for evaluation, education and development of treatment plan: 35 minutes        I, Yann Jeronimo ATC, am scribing for and in the presence of Dr. Tricia Prakash. 03/10/23 9:28 AM Yann Jeronimo ATC     I attest that I met personally with the patient, performed the described exam, reviewed the radiographic studies and medical records associated with this patient and supervised the services that are described above.      Victoriano Perez MD

## 2025-07-22 ENCOUNTER — OFFICE VISIT (OUTPATIENT)
Dept: VASCULAR SURGERY | Age: 65
End: 2025-07-22

## 2025-07-22 VITALS
SYSTOLIC BLOOD PRESSURE: 100 MMHG | DIASTOLIC BLOOD PRESSURE: 62 MMHG | TEMPERATURE: 96.7 F | OXYGEN SATURATION: 95 % | HEART RATE: 81 BPM

## 2025-07-22 DIAGNOSIS — L97.529 ULCER OF LEFT FOOT, UNSPECIFIED ULCER STAGE (HCC): ICD-10-CM

## 2025-07-22 DIAGNOSIS — R26.9 ABNORMAL GAIT: Primary | ICD-10-CM

## 2025-07-22 DIAGNOSIS — E11.610 CHARCOT FOOT DUE TO DIABETES MELLITUS (HCC): ICD-10-CM

## 2025-07-22 DIAGNOSIS — E11.22 CHRONIC KIDNEY DISEASE DUE TO TYPE 2 DIABETES MELLITUS (HCC): ICD-10-CM

## 2025-07-22 DIAGNOSIS — E66.01 OBESITY, MORBID, BMI 40.0-49.9 (HCC): ICD-10-CM

## 2025-07-22 PROBLEM — G93.41 ACUTE METABOLIC ENCEPHALOPATHY: Status: ACTIVE | Noted: 2024-05-08

## 2025-07-22 PROBLEM — R65.21 SEPTIC SHOCK (HCC): Status: ACTIVE | Noted: 2025-04-19

## 2025-07-22 PROBLEM — M14.679 CHARCOT'S JOINT OF FOOT: Status: ACTIVE | Noted: 2024-05-08

## 2025-07-22 PROBLEM — N17.9 AKI (ACUTE KIDNEY INJURY): Status: ACTIVE | Noted: 2024-05-08

## 2025-07-22 PROBLEM — E55.9 VITAMIN D DEFICIENCY: Status: ACTIVE | Noted: 2020-01-14

## 2025-07-22 PROBLEM — S22.42XA MULTIPLE CLOSED FRACTURES OF RIBS OF LEFT SIDE: Status: ACTIVE | Noted: 2025-04-19

## 2025-07-22 PROBLEM — N20.0 CALCULUS OF KIDNEY: Status: ACTIVE | Noted: 2020-12-05

## 2025-07-22 PROBLEM — Z95.0 PACEMAKER: Status: ACTIVE | Noted: 2025-05-09

## 2025-07-22 PROBLEM — Z95.0 S/P PLACEMENT OF CARDIAC PACEMAKER: Status: ACTIVE | Noted: 2025-06-19

## 2025-07-22 PROBLEM — R78.81 BACTEREMIA: Status: ACTIVE | Noted: 2024-05-09

## 2025-07-22 PROBLEM — D35.1 BENIGN NEOPLASM OF PARATHYROID GLAND: Status: ACTIVE | Noted: 2025-07-22

## 2025-07-22 PROBLEM — N31.9 NEUROGENIC BLADDER: Status: ACTIVE | Noted: 2023-09-05

## 2025-07-22 PROBLEM — L92.0 GRANULOMA ANNULARE: Status: ACTIVE | Noted: 2021-02-20

## 2025-07-22 PROBLEM — J45.40 MODERATE PERSISTENT ASTHMA: Status: ACTIVE | Noted: 2017-12-20

## 2025-07-22 PROBLEM — Z95.5 CORONARY ARTERY GRAFT PRESENT: Status: ACTIVE | Noted: 2025-06-24

## 2025-07-22 PROBLEM — N25.81 SECONDARY HYPERPARATHYROIDISM OF RENAL ORIGIN: Status: ACTIVE | Noted: 2025-07-22

## 2025-07-22 PROBLEM — E53.8 CYANOCOBALAMIN DEFICIENCY: Status: ACTIVE | Noted: 2020-12-09

## 2025-07-22 PROBLEM — L97.509 FOOT ULCER (HCC): Status: ACTIVE | Noted: 2025-07-22

## 2025-07-22 PROBLEM — K63.5 POLYP OF COLON: Status: ACTIVE | Noted: 2020-12-05

## 2025-07-22 PROBLEM — F32.A DEPRESSIVE DISORDER: Chronic | Status: ACTIVE | Noted: 2020-12-05

## 2025-07-22 PROBLEM — I11.0 HYPERTENSIVE HEART DISEASE WITH HEART FAILURE (HCC): Status: ACTIVE | Noted: 2025-06-24

## 2025-07-22 PROBLEM — A41.9 SEPTIC SHOCK (HCC): Status: ACTIVE | Noted: 2025-04-19

## 2025-07-22 PROBLEM — R94.5 LIVER FUNCTION ABNORMALITY: Status: ACTIVE | Noted: 2020-12-09

## 2025-07-22 PROBLEM — E11.42 DIABETIC POLYNEUROPATHY ASSOCIATED WITH TYPE 2 DIABETES MELLITUS (HCC): Status: ACTIVE | Noted: 2020-01-14

## 2025-07-22 PROBLEM — E87.6 HYPOKALEMIA: Status: ACTIVE | Noted: 2020-12-05

## 2025-07-22 PROBLEM — I44.2 COMPLETE HEART BLOCK (HCC): Status: ACTIVE | Noted: 2025-04-21

## 2025-07-22 PROBLEM — I10 ESSENTIAL HYPERTENSION: Status: ACTIVE | Noted: 2023-09-05

## 2025-07-22 PROBLEM — I05.0 MITRAL VALVE STENOSIS, MODERATE: Status: ACTIVE | Noted: 2025-06-24

## 2025-07-22 PROBLEM — J45.20 MILD INTERMITTENT ASTHMA WITHOUT COMPLICATION: Status: ACTIVE | Noted: 2023-09-05

## 2025-07-22 PROBLEM — I51.89 DIASTOLIC DYSFUNCTION: Status: ACTIVE | Noted: 2019-03-22

## 2025-07-22 PROBLEM — R80.9 MICROALBUMINURIA: Status: ACTIVE | Noted: 2020-12-05

## 2025-07-22 PROBLEM — I50.33 ACUTE ON CHRONIC DIASTOLIC HEART FAILURE (HCC): Status: ACTIVE | Noted: 2020-12-09

## 2025-07-22 PROBLEM — J96.01 ACUTE RESPIRATORY FAILURE WITH HYPOXIA (HCC): Status: ACTIVE | Noted: 2025-06-19

## 2025-07-22 PROBLEM — E11.39 DIABETIC OCULOPATHY ASSOCIATED WITH TYPE 2 DIABETES MELLITUS (HCC): Status: ACTIVE | Noted: 2020-12-05

## 2025-07-22 PROBLEM — M62.81 MUSCLE WEAKNESS: Status: ACTIVE | Noted: 2025-06-26

## 2025-07-22 PROBLEM — D72.829 LEUKOCYTOSIS: Status: ACTIVE | Noted: 2020-12-05

## 2025-07-22 PROBLEM — E83.52 HYPERCALCEMIA: Status: ACTIVE | Noted: 2020-12-05

## 2025-07-22 PROBLEM — M21.969 DEFORMITY OF FOOT: Status: ACTIVE | Noted: 2019-08-09

## 2025-07-22 PROBLEM — D50.9 IRON DEFICIENCY ANEMIA: Status: ACTIVE | Noted: 2020-09-29

## 2025-07-22 PROBLEM — K64.8 INTERNAL HEMORRHOIDS: Status: ACTIVE | Noted: 2020-12-05

## 2025-07-22 PROBLEM — Z98.84 HISTORY OF ROUX-EN-Y GASTRIC BYPASS: Status: ACTIVE | Noted: 2023-08-21

## 2025-07-22 PROBLEM — K21.9 GASTROESOPHAGEAL REFLUX DISEASE: Status: ACTIVE | Noted: 2020-12-05

## 2025-07-22 PROBLEM — F41.9 ANXIETY: Chronic | Status: ACTIVE | Noted: 2025-04-10

## 2025-07-22 PROBLEM — Z74.1 NEED FOR ASSISTANCE WITH PERSONAL CARE: Status: ACTIVE | Noted: 2025-06-26

## 2025-07-22 PROBLEM — R15.9 INCONTINENCE OF FECES: Status: ACTIVE | Noted: 2020-12-05

## 2025-07-22 PROBLEM — E21.3 HYPERPARATHYROIDISM: Status: ACTIVE | Noted: 2025-07-22

## 2025-07-22 PROBLEM — M51.369 DEGENERATION OF LUMBAR INTERVERTEBRAL DISC: Status: ACTIVE | Noted: 2020-12-05

## 2025-07-22 PROBLEM — E11.21 DIABETIC RENAL DISEASE (HCC): Status: ACTIVE | Noted: 2025-07-22

## 2025-07-22 PROBLEM — E29.1 DEFICIENCY OF TESTOSTERONE BIOSYNTHESIS: Status: ACTIVE | Noted: 2018-05-01

## 2025-07-22 PROBLEM — J18.9 PNEUMONIA OF BOTH LOWER LOBES DUE TO INFECTIOUS ORGANISM: Status: ACTIVE | Noted: 2025-04-19

## 2025-07-22 PROBLEM — N18.9 ANEMIA DUE TO CHRONIC KIDNEY DISEASE: Status: ACTIVE | Noted: 2025-06-24

## 2025-07-22 PROBLEM — E83.42 HYPOMAGNESEMIA: Status: ACTIVE | Noted: 2019-05-30

## 2025-07-22 PROBLEM — J15.9 BACTERIAL PNEUMONIA: Status: ACTIVE | Noted: 2025-04-19

## 2025-07-22 PROBLEM — E11.9 DIABETES MELLITUS (HCC): Status: ACTIVE | Noted: 2021-08-24

## 2025-07-22 PROBLEM — D63.1 ANEMIA DUE TO CHRONIC KIDNEY DISEASE: Status: ACTIVE | Noted: 2025-06-24

## 2025-07-22 PROBLEM — I25.10 ATHEROSCLEROSIS OF CORONARY ARTERY WITHOUT ANGINA PECTORIS: Status: ACTIVE | Noted: 2025-06-24

## 2025-07-22 PROBLEM — K57.90 DIVERTICULAR DISEASE: Status: ACTIVE | Noted: 2020-12-05

## 2025-07-22 PROBLEM — R32 URINARY INCONTINENCE: Status: ACTIVE | Noted: 2020-12-05

## 2025-07-22 PROBLEM — Z86.39 HISTORY OF DIABETES MELLITUS: Status: ACTIVE | Noted: 2023-08-21

## 2025-07-22 PROBLEM — M19.90 ARTHRITIS: Status: ACTIVE | Noted: 2025-01-20

## 2025-07-22 PROBLEM — Z98.84 STATUS POST BARIATRIC SURGERY: Status: ACTIVE | Noted: 2025-06-24

## 2025-07-22 PROBLEM — E78.2 MIXED HYPERLIPIDEMIA: Status: ACTIVE | Noted: 2017-10-23

## 2025-07-22 PROBLEM — G47.33 OSA (OBSTRUCTIVE SLEEP APNEA): Status: ACTIVE | Noted: 2025-06-24

## 2025-07-22 RX ORDER — GABAPENTIN 400 MG/1
800 CAPSULE ORAL 3 TIMES DAILY
COMMUNITY

## 2025-07-22 RX ORDER — TIRZEPATIDE 2.5 MG/.5ML
2.5 INJECTION, SOLUTION SUBCUTANEOUS WEEKLY
COMMUNITY

## 2025-07-22 RX ORDER — M-VIT,TX,IRON,MINS/CALC/FOLIC 27MG-0.4MG
1 TABLET ORAL DAILY
COMMUNITY

## 2025-07-22 RX ORDER — FUROSEMIDE 40 MG/1
40 TABLET ORAL 2 TIMES DAILY
COMMUNITY

## 2025-07-22 RX ORDER — DIPHENOXYLATE HYDROCHLORIDE AND ATROPINE SULFATE 2.5; .025 MG/1; MG/1
1 TABLET ORAL 2 TIMES DAILY
COMMUNITY

## 2025-07-22 RX ORDER — OXYCODONE AND ACETAMINOPHEN 5; 325 MG/1; MG/1
2 TABLET ORAL EVERY 4 HOURS PRN
COMMUNITY

## 2025-07-22 RX ORDER — MIDODRINE HYDROCHLORIDE 2.5 MG/1
2.5 TABLET ORAL 3 TIMES DAILY
COMMUNITY

## 2025-07-22 RX ORDER — LORAZEPAM 0.5 MG/1
0.5 TABLET ORAL EVERY 6 HOURS PRN
COMMUNITY

## 2025-07-22 NOTE — PROGRESS NOTES
[facility] Vascular Surgery  Nazanin Irvin MD, FACS, [facility]  [phone]    OUTPATIENT CONSULTATION    Date of Consultation:   7/22/2025    PCP:  Annalise Avila MD       Chief Complaint: Left charcot foot     History of Present Illness:   We are asked to see this patient in consultation by Dr. Corrina Arce regarding evaluation for left leg amputation.    Solitario Morrison is a 64 y.o. male who presents today with approx one year history of left foot charcot deformity.  Had left foot/ankle fracture a little over one year ago.  Unfortunately, this was complicated by Charcot and his pre-existing neuropathic changes.  Currently, can put little weight on the ankle.  It is unstable.  Using a CROW walker which has also rubbed a blister on the heel.  Was admitted in April and June 2025 for sepsis related to PNA.  He was in ICU and required vasopressors at that time.  He has become very debilitated over the last year and feels generally weak.  Otherwise, in good spirits and understands he will likely need amputation.          Past Medical History:  Past Medical History:   Diagnosis Date    Arthritis     Asthma     CAD (coronary artery disease)     Depression     Diabetes mellitus (HCC)     Essential hypertension 9/5/2023    GERD (gastroesophageal reflux disease)     Hyperlipidemia     MI, old 2010    Obesity     Urinary frequency     Urinary urgency     Vitamin D deficiency        Past Surgical History:  Past Surgical History:   Procedure Laterality Date    ANGIOPLASTY  sept 2015    stent    CHOLECYSTECTOMY      HERNIA REPAIR      three    OTHER SURGICAL HISTORY N/A 10/20/2015    LAPAROSCOPIC SLEEVE GASTRECTOMY           OTHER SURGICAL HISTORY      interstim     bladder and bowel Right Back Hip    [place] LAPS GSTR RSTCV PX W/BYP HARJIT-EN-Y LIMB <150 CM N/A 11/19/2018    LAPAROSCOPIC GASTRIC BYPASS WITH POSSIBLE HIATAL HERNIA REPAIR performed by Brittni Bangura DO at [facility] OR    HARJIT-EN-Y GASTRIC BYPASS  11/19/2018

## 2025-07-24 ENCOUNTER — OFFICE VISIT (OUTPATIENT)
Dept: VASCULAR SURGERY | Age: 65
End: 2025-07-24

## 2025-07-24 VITALS
OXYGEN SATURATION: 93 % | DIASTOLIC BLOOD PRESSURE: 62 MMHG | SYSTOLIC BLOOD PRESSURE: 138 MMHG | TEMPERATURE: 97.3 F | HEART RATE: 78 BPM

## 2025-07-24 DIAGNOSIS — E11.42 DIABETIC POLYNEUROPATHY ASSOCIATED WITH TYPE 2 DIABETES MELLITUS (HCC): ICD-10-CM

## 2025-07-24 DIAGNOSIS — E66.01 MORBID OBESITY (HCC): Chronic | ICD-10-CM

## 2025-07-24 DIAGNOSIS — R26.9 ABNORMAL GAIT: ICD-10-CM

## 2025-07-24 DIAGNOSIS — E11.610 CHARCOT FOOT DUE TO DIABETES MELLITUS (HCC): Primary | ICD-10-CM

## 2025-07-24 DIAGNOSIS — R60.9 EDEMA, UNSPECIFIED TYPE: ICD-10-CM

## 2025-07-24 DIAGNOSIS — I44.2 COMPLETE HEART BLOCK (HCC): ICD-10-CM

## 2025-07-24 DIAGNOSIS — I25.10 ARTERIOSCLEROSIS OF CORONARY ARTERY: ICD-10-CM

## 2025-07-24 NOTE — PROGRESS NOTES
2025     Solitario Morrison (:  1960) is a 64 y.o. male,Established patient, here for evaluation of the following chief complaint(s):  Follow-up (discuss BKA)        ASSESSMENT/PLAN:  1. Charcot foot due to diabetes mellitus (HCC)  2. Arteriosclerosis of coronary artery  3. Abnormal gait  4. Complete heart block (HCC)  5. Diabetic polyneuropathy associated with type 2 diabetes mellitus (HCC)  6. Edema, unspecified type  7. Morbid obesity (HCC)    Solitario was referred for an unreconstructable left foot and ankle deformity related to diabetes.  He is here with his wife and friend to discuss below-knee amputation.  Multiple questions were answered and the expected perioperative course discussed.  They would like to go home and discussed this together, trying to absorb all the information and decide what they want to do.  They will call when and if they decide to proceed with below-knee amputation.  I did encourage them to strongly consider this and not to put off too far into the future, as I think that this is a potential nidus for serious infection that may result in sepsis, higher than expected lower extremity amputation, cardiopulmonary complications, and possibly death.  No follow-ups on file.       SUBJECTIVE/OBJECTIVE:  Solitario returns today to discuss left below knee amputation for treatment of Charcot deformity.    He is now accompanied by his wife and a friend, the latter of whom is an amputee.  I discussed with Dr. Corrina Arce.  Left foot and ankle non-reconstructable.  Using a CROW walker which now appears to be rubbing blisters onto foot.      Patient hospitalized April and 2025 for septic shock due to pneumonia. His course was complicated by development of complete heart block requiring dual chamber pacemaker implantation.     They have multiple questions about the surgery itself, expected perioperative course, as well as questions regarding obtaining prosthetic and postoperative rehabilitation.

## (undated) DEVICE — DUAL CUT SAGITTAL BLADE

## (undated) DEVICE — GAUZE,SPONGE,2"X2",8PLY,STERILE,LF,2'S: Brand: MEDLINE

## (undated) DEVICE — SOLUTION IV IRRIG WATER 1000ML POUR BRL 2F7114

## (undated) DEVICE — GAUZE,SPONGE,4"X4",16PLY,XRAY,STRL,LF: Brand: MEDLINE

## (undated) DEVICE — PACK PROCEDURE SURG TOTAL KNEE

## (undated) DEVICE — SURE SET-DOUBLE BASIN-LF: Brand: MEDLINE INDUSTRIES, INC.

## (undated) DEVICE — STRIP,CLOSURE,WOUND,MEDI-STRIP,1/2X4: Brand: MEDLINE

## (undated) DEVICE — SUTURE MCRYL SZ 4-0 L18IN ABSRB UD L19MM PS-2 3/8 CIR PRIM Y496G

## (undated) DEVICE — DEVICE CLSR TRCR PRT

## (undated) DEVICE — ELECTRODE PT RET AD L9FT HI MOIST COND ADH HYDRGEL CORDED

## (undated) DEVICE — INTENDED FOR TISSUE SEPARATION, AND OTHER PROCEDURES THAT REQUIRE A SHARP SURGICAL BLADE TO PUNCTURE OR CUT.: Brand: BARD-PARKER ® CARBON RIB-BACK BLADES

## (undated) DEVICE — GARMENT,MEDLINE,DVT,INT,CALF,MED, GEN2: Brand: MEDLINE

## (undated) DEVICE — DECANTER BAG 9": Brand: MEDLINE INDUSTRIES, INC.

## (undated) DEVICE — SOLUTION IV 1000ML 0.9% SOD CHL

## (undated) DEVICE — SYRINGE IRRIG 60ML SFT PLIABLE BLB EZ TO GRP 1 HND USE W/

## (undated) DEVICE — 1010 S-DRAPE TOWEL DRAPE 10/BX: Brand: STERI-DRAPE™

## (undated) DEVICE — APPLICATOR PREP 26ML 0.7% IOD POVACRYLEX 74% ISO ALC ST

## (undated) DEVICE — GLOVE ORTHO 8   MSG9480

## (undated) DEVICE — PACK,BASIC: Brand: MEDLINE

## (undated) DEVICE — MARKER SURG SKIN UTIL BLK REG TIP NONSMEARING W/ 6IN RUL

## (undated) DEVICE — CORD RETRCT SIL

## (undated) DEVICE — E-Z CLEAN, NON-STICK, PTFE COATED, ELECTROSURGICAL BLADE ELECTRODE, 2.5 INCH (6.35 CM): Brand: EZ CLEAN

## (undated) DEVICE — TROCAR ENDOSCP L100MM DIA15MM BLDELSS STBL SL ENDOPATH XCEL

## (undated) DEVICE — RELOAD STPL L60MM H1-2.6MM MESENTERY THN TISS WHT 6 ROW

## (undated) DEVICE — 3M™ TEGADERM™ TRANSPARENT FILM DRESSING FRAME STYLE, 1624W, 2-3/8 IN X 2-3/4 IN (6 CM X 7 CM), 100/CT 4CT/CASE: Brand: 3M™ TEGADERM™

## (undated) DEVICE — GOWN,SIRUS,POLYRNF,BRTHSLV,LG,30/CS: Brand: MEDLINE

## (undated) DEVICE — TOWEL,OR,DSP,ST,BLUE,DLX,8/PK,10PK/CS: Brand: MEDLINE

## (undated) DEVICE — BOOT POS LEG DEMAYO

## (undated) DEVICE — BIPOLAR SEALER 23-112-1 AQM 6.0: Brand: AQUAMANTYS ®

## (undated) DEVICE — TRUE CONTENT TO BE POPULATED AS PART OF REBRANDING: Brand: ARGYLE

## (undated) DEVICE — SST BUR, WIRE PASS DRILL, 2 FLUTES, MED., 2MM DIA.: Brand: MICROAIRE®

## (undated) DEVICE — 2.0MM WIRE PASS DRILL BIT

## (undated) DEVICE — [HIGH FLOW HEATED INSUFFLATOR TUBING,  DO NOT USE IF PACKAGE IS DAMAGED]

## (undated) DEVICE — DRAPE SURG UTIL PK GEN

## (undated) DEVICE — SYRINGE CATH TIP 50ML

## (undated) DEVICE — GOWN,SIRUS,POLYRNF,BRTHSLV,XL,30/CS: Brand: MEDLINE

## (undated) DEVICE — STANDARD HYPODERMIC NEEDLE,POLYPROPYLENE HUB: Brand: MONOJECT

## (undated) DEVICE — COVER LT HNDL BLU PLAS

## (undated) DEVICE — COUNTER NDL 40 COUNT HLD 70 NUM FOAM BLK SGL MAG W BLDE REMV

## (undated) DEVICE — SOLUTION ANTIFOG VIS SYS CLEARIFY LAPSCP

## (undated) DEVICE — CUFF RESTRN WRST OR ANK 45FT AD FOAM

## (undated) DEVICE — APPLICATOR MEDICATED 26 CC SOLUTION HI LT ORNG CHLORAPREP

## (undated) DEVICE — COAXIAL HIGH FLOW TIP WITH SOFT SHIELD

## (undated) DEVICE — SYSTEM SMK EVAC LAP TBNG FILTER HSNG BENT STYL PNK SEE CLR

## (undated) DEVICE — UNDERGLOVE SURG SZ 8 BLU LTX FREE SYN POLYISOPRENE POLYMER

## (undated) DEVICE — SUTURE VCRL SZ 2-0 L27IN ABSRB VLT L26MM SH 1/2 CIR J317H

## (undated) DEVICE — PLATE ES AD W 9FT CRD 2

## (undated) DEVICE — SYSTEM SKIN CLSR 22CM DERMBND PRINEO

## (undated) DEVICE — GLOVE SURG SZ 7 L12IN FNGR THK75MIL WHT LTX POLYMER BEAD

## (undated) DEVICE — SHEARS LAP L45CM DIA5MM ULTRASONIC CRV TIP ADV HEMSTAS HARM

## (undated) DEVICE — CUTTER SURG OD46MM PAT KNEE DISP FOR RM SYS XCELERATE

## (undated) DEVICE — LAPAROSCOPIC SCISSORS: Brand: EPIX LAPAROSCOPIC SCISSORS

## (undated) DEVICE — STAPLER SKIN L440MM 32MM LNG 12 FIRING B FRM PWR + GRIPPING

## (undated) DEVICE — PIN BONE FIX L110MM DIA3.2MM

## (undated) DEVICE — GLOVE SURG SZ 75 L12IN FNGR THK87MIL DK GRN LTX POLYMER W

## (undated) DEVICE — SYRINGE MED 10ML TRNSLUC BRL PLUNG BLK MRK POLYPR CTRL

## (undated) DEVICE — SOLUTION INJ LR VISIV 1000ML BG

## (undated) DEVICE — TIP APPL TOP 2 SPRY

## (undated) DEVICE — STAPLER INT L L25MM DIA5MM GI WHT TI BARIATRIC CIR CUT 2

## (undated) DEVICE — Z DISCONTINUED INTRODUCER ENDOSCP L25MM TRANSANAL ABD FOR STPLR DST SER

## (undated) DEVICE — SUTURE PDS II SZ 0 L27IN ABSRB VLT L26MM CT-2 1/2 CIR Z334H

## (undated) DEVICE — GOWN,SIRUS,FABRNF,XL,20/CS: Brand: MEDLINE

## (undated) DEVICE — TURNOVER KIT RM INF CTRL TECH

## (undated) DEVICE — HANDPIECE SUCTION TUBING INTERPULSE 10FT

## (undated) DEVICE — DRESSING THERABOND 3D ANTIMIC CNTCT SYS 15INCHX10INCH

## (undated) DEVICE — SUTURE VCRL SZ 0 L18IN ABSRB UD L36MM CT-1 1/2 CIR J840D

## (undated) DEVICE — PEEL-AWAY HOOD: Brand: FLYTE, SURGICOOL

## (undated) DEVICE — PUMP SUC IRR TBNG L10FT W/ HNDPC ASSEMB STRYKEFLOW 2

## (undated) DEVICE — SUTURE PERMA-HAND SZ 2-0 L30IN NONABSORBABLE BLK L26MM SH K833H

## (undated) DEVICE — TIP APPL L35CM RIG FOR SEAL EVICEL

## (undated) DEVICE — PENCIL ES L3M ROCK SWCH S STL HEX LOK BLDE ELECTRD HOLSTER

## (undated) DEVICE — 3M™ STERI-DRAPE™ U-DRAPE 1015: Brand: STERI-DRAPE™

## (undated) DEVICE — Z DISCONTINUED NO SUB IDED CONNECTOR SURG HEMOSTATIC OPN FLD FOR BIOMATERIAL

## (undated) DEVICE — SUTURE MCRYL SZ 4-0 L27IN ABSRB UD L19MM PS-2 1/2 CIR PRIM Y426H

## (undated) DEVICE — SUTURE STRATAFIX SPRL SZ 1 L14IN ABSRB VLT L48CM CTX 1/2 SXPD2B405

## (undated) DEVICE — SURGICAL SET UP - SURE SET: Brand: MEDLINE INDUSTRIES, INC.

## (undated) DEVICE — TROCAR ENDOSCP L100MM DIA12MM BLDELSS OBT RADLUC STBL SL

## (undated) DEVICE — DRESSING WND POSTOP N WT BEAR APPL

## (undated) DEVICE — KIT TRK KNEE PROC VIZADISC

## (undated) DEVICE — 3M™ TEGADERM™ TRANSPARENT FILM DRESSING FRAME STYLE, 1627, 4 IN X 10 IN (10 CM X 25 CM), 20/CT 4CT/CASE: Brand: 3M™ TEGADERM™

## (undated) DEVICE — BLADE SURG SAW STD S STL OSC W/ SERR EDGE DISP

## (undated) DEVICE — RESERVOIR,SUCTION,100CC,SILICONE: Brand: MEDLINE

## (undated) DEVICE — STAPLER INT W25MM WHT TRNSOR CIR ANVIL W/ ADVANCING PROX

## (undated) DEVICE — YANKAUER,OPEN TIP,W/O VENT,STERILE: Brand: MEDLINE INDUSTRIES, INC.

## (undated) DEVICE — ORTHO PRE OP PACK: Brand: MEDLINE INDUSTRIES, INC.

## (undated) DEVICE — SUTURE VCRL SZ 2-0 L18IN ABSRB UD CT-1 L36MM 1/2 CIR J839D

## (undated) DEVICE — ELECTRODE WIRE L-HOOK LAPSCP OPT12

## (undated) DEVICE — PAD,ABDOMINAL,5"X9",ST,LF,25/BX: Brand: MEDLINE INDUSTRIES, INC.

## (undated) DEVICE — GLOVE SURG SZ 8 L12IN FNGR THK94MIL STD WHT LTX FREE

## (undated) DEVICE — KIT DRP FOR RIO ROBOTIC ARM ASST SYS

## (undated) DEVICE — Z DUP USE 2701075 SYSTEM SKIN CLSR 42CM DERMBND PRINEO

## (undated) DEVICE — GARMENT,MEDLINE,DVT,INT,CALF,LG, GEN2: Brand: MEDLINE

## (undated) DEVICE — KIT APPL 11:1 PROC W/ FIBRIJET MED CUP APPL TIP TY

## (undated) DEVICE — 60 ML SYRINGE,CATHETER TIP: Brand: MONOJECT

## (undated) DEVICE — SOLUTION SALINE IRRIGATION 250ML STERILE

## (undated) DEVICE — DRAPE,LAP,CHOLE,W/TROUGHS,STERILE: Brand: MEDLINE

## (undated) DEVICE — BOWL AND CEMENT CARTRIDGE WITH BREAKAWAY FEMORAL NOZZLE AND MEDIUM PRESSURIZER: Brand: ACM

## (undated) DEVICE — STERILE PVP: Brand: MEDLINE INDUSTRIES, INC.

## (undated) DEVICE — Z DISCONTINUED NO SUB IDED GLOVE SURG BEAD CUF 8 STD PF WHT STRL TRIUMPH LT LTX

## (undated) DEVICE — SUTURE VCRL SZ 1 L18IN ABSRB UD L36MM CT-1 1/2 CIR J841D

## (undated) DEVICE — TISSUE RETRIEVAL SYSTEM: Brand: INZII RETRIEVAL SYSTEM

## (undated) DEVICE — JEWISH HOSPITAL TURNOVER KIT: Brand: MEDLINE INDUSTRIES, INC.